# Patient Record
Sex: MALE | Race: WHITE | Employment: FULL TIME | URBAN - NONMETROPOLITAN AREA
[De-identification: names, ages, dates, MRNs, and addresses within clinical notes are randomized per-mention and may not be internally consistent; named-entity substitution may affect disease eponyms.]

---

## 2019-10-28 ENCOUNTER — APPOINTMENT (OUTPATIENT)
Dept: CT IMAGING | Age: 34
End: 2019-10-28
Payer: MEDICARE

## 2019-10-28 ENCOUNTER — HOSPITAL ENCOUNTER (EMERGENCY)
Age: 34
Discharge: HOME | End: 2019-10-28
Attending: EMERGENCY MEDICINE
Payer: MEDICARE

## 2019-10-28 VITALS
DIASTOLIC BLOOD PRESSURE: 59 MMHG | OXYGEN SATURATION: 99 % | HEART RATE: 49 BPM | RESPIRATION RATE: 18 BRPM | TEMPERATURE: 97.5 F | WEIGHT: 165 LBS | SYSTOLIC BLOOD PRESSURE: 100 MMHG

## 2019-10-28 DIAGNOSIS — R10.9 RIGHT FLANK PAIN: Primary | ICD-10-CM

## 2019-10-28 LAB
-: ABNORMAL
ABSOLUTE EOS #: 0.85 K/UL (ref 0–0.44)
ABSOLUTE IMMATURE GRANULOCYTE: <0.03 K/UL (ref 0–0.3)
ABSOLUTE LYMPH #: 2.66 K/UL (ref 1.1–3.7)
ABSOLUTE MONO #: 0.72 K/UL (ref 0.1–1.2)
ALBUMIN SERPL-MCNC: 4.7 G/DL (ref 3.5–5.2)
ALBUMIN/GLOBULIN RATIO: 1.6 (ref 1–2.5)
ALP BLD-CCNC: 70 U/L (ref 40–129)
ALT SERPL-CCNC: 41 U/L (ref 5–41)
AMORPHOUS: ABNORMAL
ANION GAP SERPL CALCULATED.3IONS-SCNC: 10 MMOL/L (ref 9–17)
AST SERPL-CCNC: 26 U/L
BACTERIA: ABNORMAL
BASOPHILS # BLD: 1 % (ref 0–2)
BASOPHILS ABSOLUTE: 0.07 K/UL (ref 0–0.2)
BILIRUB SERPL-MCNC: 0.29 MG/DL (ref 0.3–1.2)
BILIRUBIN URINE: NEGATIVE
BUN BLDV-MCNC: 11 MG/DL (ref 6–20)
BUN/CREAT BLD: 15 (ref 9–20)
CALCIUM SERPL-MCNC: 9.8 MG/DL (ref 8.6–10.4)
CASTS UA: ABNORMAL /LPF
CHLORIDE BLD-SCNC: 111 MMOL/L (ref 98–107)
CO2: 23 MMOL/L (ref 20–31)
COLOR: YELLOW
COMMENT UA: ABNORMAL
CREAT SERPL-MCNC: 0.74 MG/DL (ref 0.7–1.2)
CRYSTALS, UA: ABNORMAL /HPF
DIFFERENTIAL TYPE: ABNORMAL
EOSINOPHILS RELATIVE PERCENT: 10 % (ref 1–4)
EPITHELIAL CELLS UA: ABNORMAL /HPF (ref 0–5)
GFR AFRICAN AMERICAN: >60 ML/MIN
GFR NON-AFRICAN AMERICAN: >60 ML/MIN
GFR SERPL CREATININE-BSD FRML MDRD: ABNORMAL ML/MIN/{1.73_M2}
GFR SERPL CREATININE-BSD FRML MDRD: ABNORMAL ML/MIN/{1.73_M2}
GLUCOSE BLD-MCNC: 99 MG/DL (ref 70–99)
GLUCOSE URINE: NEGATIVE
HCT VFR BLD CALC: 48 % (ref 40.7–50.3)
HEMOGLOBIN: 15.9 G/DL (ref 13–17)
IMMATURE GRANULOCYTES: 0 %
KETONES, URINE: NEGATIVE
LEUKOCYTE ESTERASE, URINE: NEGATIVE
LIPASE: 74 U/L (ref 13–60)
LYMPHOCYTES # BLD: 32 % (ref 24–43)
MCH RBC QN AUTO: 31.8 PG (ref 25.2–33.5)
MCHC RBC AUTO-ENTMCNC: 33.1 G/DL (ref 28.4–34.8)
MCV RBC AUTO: 96 FL (ref 82.6–102.9)
MONOCYTES # BLD: 9 % (ref 3–12)
MUCUS: ABNORMAL
NITRITE, URINE: NEGATIVE
NRBC AUTOMATED: 0 PER 100 WBC
OTHER OBSERVATIONS UA: ABNORMAL
PDW BLD-RTO: 13.2 % (ref 11.8–14.4)
PH UA: 6 (ref 5–9)
PLATELET # BLD: 262 K/UL (ref 138–453)
PLATELET ESTIMATE: ABNORMAL
PMV BLD AUTO: 9.2 FL (ref 8.1–13.5)
POTASSIUM SERPL-SCNC: 4.5 MMOL/L (ref 3.7–5.3)
PROTEIN UA: NEGATIVE
RBC # BLD: 5 M/UL (ref 4.21–5.77)
RBC # BLD: ABNORMAL 10*6/UL
RBC UA: ABNORMAL /HPF (ref 0–2)
RENAL EPITHELIAL, UA: ABNORMAL /HPF
SEG NEUTROPHILS: 48 % (ref 36–65)
SEGMENTED NEUTROPHILS ABSOLUTE COUNT: 4.12 K/UL (ref 1.5–8.1)
SODIUM BLD-SCNC: 144 MMOL/L (ref 135–144)
SPECIFIC GRAVITY UA: >1.03 (ref 1.01–1.02)
TOTAL PROTEIN: 7.7 G/DL (ref 6.4–8.3)
TRICHOMONAS: ABNORMAL
TURBIDITY: CLEAR
URINE HGB: NEGATIVE
UROBILINOGEN, URINE: NORMAL
WBC # BLD: 8.4 K/UL (ref 3.5–11.3)
WBC # BLD: ABNORMAL 10*3/UL
WBC UA: ABNORMAL /HPF (ref 0–5)
YEAST: ABNORMAL

## 2019-10-28 PROCEDURE — 74176 CT ABD & PELVIS W/O CONTRAST: CPT

## 2019-10-28 PROCEDURE — 2580000003 HC RX 258: Performed by: EMERGENCY MEDICINE

## 2019-10-28 PROCEDURE — 6360000002 HC RX W HCPCS: Performed by: EMERGENCY MEDICINE

## 2019-10-28 PROCEDURE — 83690 ASSAY OF LIPASE: CPT

## 2019-10-28 PROCEDURE — 96374 THER/PROPH/DIAG INJ IV PUSH: CPT

## 2019-10-28 PROCEDURE — 80053 COMPREHEN METABOLIC PANEL: CPT

## 2019-10-28 PROCEDURE — 36415 COLL VENOUS BLD VENIPUNCTURE: CPT

## 2019-10-28 PROCEDURE — 85025 COMPLETE CBC W/AUTO DIFF WBC: CPT

## 2019-10-28 PROCEDURE — 81001 URINALYSIS AUTO W/SCOPE: CPT

## 2019-10-28 PROCEDURE — 99284 EMERGENCY DEPT VISIT MOD MDM: CPT

## 2019-10-28 PROCEDURE — 96375 TX/PRO/DX INJ NEW DRUG ADDON: CPT

## 2019-10-28 RX ORDER — ROPINIROLE 0.5 MG/1
0.5 TABLET, FILM COATED ORAL NIGHTLY
Status: ON HOLD | COMMUNITY
End: 2022-02-12

## 2019-10-28 RX ORDER — MORPHINE SULFATE 4 MG/ML
4 INJECTION, SOLUTION INTRAMUSCULAR; INTRAVENOUS ONCE
Status: COMPLETED | OUTPATIENT
Start: 2019-10-28 | End: 2019-10-28

## 2019-10-28 RX ORDER — 0.9 % SODIUM CHLORIDE 0.9 %
1000 INTRAVENOUS SOLUTION INTRAVENOUS ONCE
Status: COMPLETED | OUTPATIENT
Start: 2019-10-28 | End: 2019-10-28

## 2019-10-28 RX ORDER — ONDANSETRON 2 MG/ML
4 INJECTION INTRAMUSCULAR; INTRAVENOUS ONCE
Status: COMPLETED | OUTPATIENT
Start: 2019-10-28 | End: 2019-10-28

## 2019-10-28 RX ADMIN — ONDANSETRON 4 MG: 2 INJECTION INTRAMUSCULAR; INTRAVENOUS at 09:24

## 2019-10-28 RX ADMIN — MORPHINE SULFATE 4 MG: 4 INJECTION, SOLUTION INTRAMUSCULAR; INTRAVENOUS at 09:24

## 2019-10-28 RX ADMIN — SODIUM CHLORIDE 1000 ML: 9 INJECTION, SOLUTION INTRAVENOUS at 09:24

## 2019-10-28 ASSESSMENT — ENCOUNTER SYMPTOMS
EYE PAIN: 0
SHORTNESS OF BREATH: 0
ABDOMINAL PAIN: 0

## 2019-10-28 ASSESSMENT — PAIN SCALES - GENERAL
PAINLEVEL_OUTOF10: 4
PAINLEVEL_OUTOF10: 7
PAINLEVEL_OUTOF10: 7

## 2019-10-28 ASSESSMENT — PAIN DESCRIPTION - PAIN TYPE: TYPE: ACUTE PAIN

## 2019-10-28 ASSESSMENT — PAIN DESCRIPTION - LOCATION
LOCATION: ABDOMEN
LOCATION: ABDOMEN

## 2019-10-28 ASSESSMENT — PAIN DESCRIPTION - ORIENTATION: ORIENTATION: RIGHT;LOWER

## 2019-10-28 ASSESSMENT — PAIN DESCRIPTION - DESCRIPTORS: DESCRIPTORS: SQUEEZING;BURNING

## 2019-10-29 ENCOUNTER — TELEPHONE (OUTPATIENT)
Dept: UROLOGY | Age: 34
End: 2019-10-29

## 2020-07-23 ENCOUNTER — HOSPITAL ENCOUNTER (EMERGENCY)
Age: 35
Discharge: ANOTHER HOSPITAL | End: 2020-07-24
Attending: EMERGENCY MEDICINE
Payer: MEDICARE

## 2020-07-23 PROCEDURE — 99284 EMERGENCY DEPT VISIT MOD MDM: CPT

## 2020-07-23 RX ORDER — GUAIFENESIN, PSEUDOEPHEDRINE HYDROCHLORIDE 600; 60 MG/1; MG/1
1 TABLET, EXTENDED RELEASE ORAL EVERY 12 HOURS
COMMUNITY

## 2020-07-23 RX ORDER — ACETAMINOPHEN 500 MG
1000 TABLET ORAL ONCE
Status: COMPLETED | OUTPATIENT
Start: 2020-07-24 | End: 2020-07-24

## 2020-07-23 RX ORDER — KETOROLAC TROMETHAMINE 15 MG/ML
15 INJECTION, SOLUTION INTRAMUSCULAR; INTRAVENOUS ONCE
Status: COMPLETED | OUTPATIENT
Start: 2020-07-24 | End: 2020-07-24

## 2020-07-23 RX ORDER — 0.9 % SODIUM CHLORIDE 0.9 %
1000 INTRAVENOUS SOLUTION INTRAVENOUS ONCE
Status: COMPLETED | OUTPATIENT
Start: 2020-07-24 | End: 2020-07-24

## 2020-07-23 RX ORDER — LIDOCAINE HYDROCHLORIDE 20 MG/ML
15 SOLUTION OROPHARYNGEAL PRN
COMMUNITY

## 2020-07-23 ASSESSMENT — PAIN DESCRIPTION - FREQUENCY: FREQUENCY: CONTINUOUS

## 2020-07-23 ASSESSMENT — PAIN DESCRIPTION - DESCRIPTORS: DESCRIPTORS: ACHING

## 2020-07-23 ASSESSMENT — PAIN DESCRIPTION - PAIN TYPE: TYPE: ACUTE PAIN

## 2020-07-23 ASSESSMENT — PAIN SCALES - GENERAL: PAINLEVEL_OUTOF10: 7

## 2020-07-24 ENCOUNTER — APPOINTMENT (OUTPATIENT)
Dept: CT IMAGING | Age: 35
End: 2020-07-24
Payer: MEDICARE

## 2020-07-24 VITALS
BODY MASS INDEX: 22.22 KG/M2 | OXYGEN SATURATION: 97 % | TEMPERATURE: 98.7 F | HEIGHT: 69 IN | HEART RATE: 68 BPM | RESPIRATION RATE: 16 BRPM | WEIGHT: 150 LBS | SYSTOLIC BLOOD PRESSURE: 115 MMHG | DIASTOLIC BLOOD PRESSURE: 72 MMHG

## 2020-07-24 LAB
-: NORMAL
ABSOLUTE EOS #: 0.54 K/UL (ref 0–0.44)
ABSOLUTE IMMATURE GRANULOCYTE: 0.04 K/UL (ref 0–0.3)
ABSOLUTE LYMPH #: 2.57 K/UL (ref 1.1–3.7)
ABSOLUTE MONO #: 1.4 K/UL (ref 0.1–1.2)
ALBUMIN SERPL-MCNC: 4.1 G/DL (ref 3.5–5.2)
ALBUMIN/GLOBULIN RATIO: 1.5 (ref 1–2.5)
ALP BLD-CCNC: 87 U/L (ref 40–129)
ALT SERPL-CCNC: 39 U/L (ref 5–41)
AMORPHOUS: NORMAL
ANION GAP SERPL CALCULATED.3IONS-SCNC: 9 MMOL/L (ref 9–17)
AST SERPL-CCNC: 30 U/L
BACTERIA: NORMAL
BASOPHILS # BLD: 1 % (ref 0–2)
BASOPHILS ABSOLUTE: 0.07 K/UL (ref 0–0.2)
BILIRUB SERPL-MCNC: 0.22 MG/DL (ref 0.3–1.2)
BILIRUBIN URINE: NEGATIVE
BUN BLDV-MCNC: 15 MG/DL (ref 6–20)
BUN/CREAT BLD: 18 (ref 9–20)
CALCIUM SERPL-MCNC: 9.1 MG/DL (ref 8.6–10.4)
CASTS UA: NORMAL /LPF
CHLORIDE BLD-SCNC: 105 MMOL/L (ref 98–107)
CO2: 25 MMOL/L (ref 20–31)
COLOR: YELLOW
COMMENT UA: NORMAL
CREAT SERPL-MCNC: 0.84 MG/DL (ref 0.7–1.2)
CRYSTALS, UA: NORMAL /HPF
DIFFERENTIAL TYPE: ABNORMAL
EOSINOPHILS RELATIVE PERCENT: 4 % (ref 1–4)
EPITHELIAL CELLS UA: NORMAL /HPF (ref 0–5)
GFR AFRICAN AMERICAN: >60 ML/MIN
GFR NON-AFRICAN AMERICAN: >60 ML/MIN
GFR SERPL CREATININE-BSD FRML MDRD: ABNORMAL ML/MIN/{1.73_M2}
GFR SERPL CREATININE-BSD FRML MDRD: ABNORMAL ML/MIN/{1.73_M2}
GLUCOSE BLD-MCNC: 93 MG/DL (ref 70–99)
GLUCOSE URINE: NEGATIVE
HCT VFR BLD CALC: 46.5 % (ref 40.7–50.3)
HEMOGLOBIN: 15.2 G/DL (ref 13–17)
IMMATURE GRANULOCYTES: 0 %
KETONES, URINE: NEGATIVE
LACTIC ACID, SEPSIS WHOLE BLOOD: NORMAL MMOL/L (ref 0.5–1.9)
LACTIC ACID, SEPSIS: 0.6 MMOL/L (ref 0.5–1.9)
LEUKOCYTE ESTERASE, URINE: NEGATIVE
LYMPHOCYTES # BLD: 18 % (ref 24–43)
MCH RBC QN AUTO: 31.7 PG (ref 25.2–33.5)
MCHC RBC AUTO-ENTMCNC: 32.7 G/DL (ref 28.4–34.8)
MCV RBC AUTO: 96.9 FL (ref 82.6–102.9)
MONOCYTES # BLD: 10 % (ref 3–12)
MUCUS: NORMAL
NITRITE, URINE: NEGATIVE
NRBC AUTOMATED: 0 PER 100 WBC
OTHER OBSERVATIONS UA: NORMAL
PDW BLD-RTO: 12.8 % (ref 11.8–14.4)
PH UA: 6.5 (ref 5–9)
PLATELET # BLD: 236 K/UL (ref 138–453)
PLATELET ESTIMATE: ABNORMAL
PMV BLD AUTO: 9.2 FL (ref 8.1–13.5)
POTASSIUM SERPL-SCNC: 4 MMOL/L (ref 3.7–5.3)
PROTEIN UA: NEGATIVE
RBC # BLD: 4.8 M/UL (ref 4.21–5.77)
RBC # BLD: ABNORMAL 10*6/UL
RBC UA: NORMAL /HPF (ref 0–2)
RENAL EPITHELIAL, UA: NORMAL /HPF
SEG NEUTROPHILS: 67 % (ref 36–65)
SEGMENTED NEUTROPHILS ABSOLUTE COUNT: 9.51 K/UL (ref 1.5–8.1)
SODIUM BLD-SCNC: 139 MMOL/L (ref 135–144)
SPECIFIC GRAVITY UA: 1.02 (ref 1.01–1.02)
TOTAL PROTEIN: 6.9 G/DL (ref 6.4–8.3)
TRICHOMONAS: NORMAL
TURBIDITY: CLEAR
URINE HGB: NEGATIVE
UROBILINOGEN, URINE: NORMAL
WBC # BLD: 14.1 K/UL (ref 3.5–11.3)
WBC # BLD: ABNORMAL 10*3/UL
WBC UA: NORMAL /HPF (ref 0–5)
YEAST: NORMAL

## 2020-07-24 PROCEDURE — 85025 COMPLETE CBC W/AUTO DIFF WBC: CPT

## 2020-07-24 PROCEDURE — 6370000000 HC RX 637 (ALT 250 FOR IP): Performed by: EMERGENCY MEDICINE

## 2020-07-24 PROCEDURE — 6360000002 HC RX W HCPCS: Performed by: EMERGENCY MEDICINE

## 2020-07-24 PROCEDURE — 87205 SMEAR GRAM STAIN: CPT

## 2020-07-24 PROCEDURE — 81001 URINALYSIS AUTO W/SCOPE: CPT

## 2020-07-24 PROCEDURE — 96365 THER/PROPH/DIAG IV INF INIT: CPT

## 2020-07-24 PROCEDURE — 36415 COLL VENOUS BLD VENIPUNCTURE: CPT

## 2020-07-24 PROCEDURE — 87040 BLOOD CULTURE FOR BACTERIA: CPT

## 2020-07-24 PROCEDURE — 6360000004 HC RX CONTRAST MEDICATION: Performed by: EMERGENCY MEDICINE

## 2020-07-24 PROCEDURE — 6360000002 HC RX W HCPCS

## 2020-07-24 PROCEDURE — 2580000003 HC RX 258: Performed by: EMERGENCY MEDICINE

## 2020-07-24 PROCEDURE — 96375 TX/PRO/DX INJ NEW DRUG ADDON: CPT

## 2020-07-24 PROCEDURE — 83605 ASSAY OF LACTIC ACID: CPT

## 2020-07-24 PROCEDURE — 80053 COMPREHEN METABOLIC PANEL: CPT

## 2020-07-24 PROCEDURE — 70491 CT SOFT TISSUE NECK W/DYE: CPT

## 2020-07-24 RX ORDER — ONDANSETRON 2 MG/ML
INJECTION INTRAMUSCULAR; INTRAVENOUS
Status: COMPLETED
Start: 2020-07-24 | End: 2020-07-24

## 2020-07-24 RX ORDER — SODIUM CHLORIDE 0.9 % (FLUSH) 0.9 %
10 SYRINGE (ML) INJECTION PRN
Status: DISCONTINUED | OUTPATIENT
Start: 2020-07-24 | End: 2020-07-24 | Stop reason: HOSPADM

## 2020-07-24 RX ORDER — DEXAMETHASONE SODIUM PHOSPHATE 10 MG/ML
10 INJECTION INTRAMUSCULAR; INTRAVENOUS ONCE
Status: COMPLETED | OUTPATIENT
Start: 2020-07-24 | End: 2020-07-24

## 2020-07-24 RX ORDER — SODIUM CHLORIDE 0.9 % (FLUSH) 0.9 %
10 SYRINGE (ML) INJECTION EVERY 12 HOURS SCHEDULED
Status: DISCONTINUED | OUTPATIENT
Start: 2020-07-24 | End: 2020-07-24 | Stop reason: HOSPADM

## 2020-07-24 RX ORDER — MORPHINE SULFATE 4 MG/ML
4 INJECTION, SOLUTION INTRAMUSCULAR; INTRAVENOUS ONCE
Status: COMPLETED | OUTPATIENT
Start: 2020-07-24 | End: 2020-07-24

## 2020-07-24 RX ADMIN — DEXAMETHASONE SODIUM PHOSPHATE 10 MG: 10 INJECTION INTRAMUSCULAR; INTRAVENOUS at 01:52

## 2020-07-24 RX ADMIN — ONDANSETRON 4 MG: 2 INJECTION INTRAMUSCULAR; INTRAVENOUS at 02:51

## 2020-07-24 RX ADMIN — IOPAMIDOL 75 ML: 755 INJECTION, SOLUTION INTRAVENOUS at 00:32

## 2020-07-24 RX ADMIN — KETOROLAC TROMETHAMINE 15 MG: 15 INJECTION, SOLUTION INTRAMUSCULAR; INTRAVENOUS at 00:11

## 2020-07-24 RX ADMIN — MORPHINE SULFATE 4 MG: 4 INJECTION, SOLUTION INTRAMUSCULAR; INTRAVENOUS at 01:52

## 2020-07-24 RX ADMIN — ACETAMINOPHEN 1000 MG: 500 TABLET, FILM COATED ORAL at 00:11

## 2020-07-24 RX ADMIN — AMPICILLIN AND SULBACTAM 1.5 G: .5; 1 INJECTION, POWDER, FOR SOLUTION INTRAMUSCULAR; INTRAVENOUS at 01:51

## 2020-07-24 RX ADMIN — SODIUM CHLORIDE 1000 ML: 9 INJECTION, SOLUTION INTRAVENOUS at 00:11

## 2020-07-24 ASSESSMENT — ENCOUNTER SYMPTOMS
SORE THROAT: 1
SHORTNESS OF BREATH: 0
ABDOMINAL PAIN: 0
EYE PAIN: 0

## 2020-07-24 ASSESSMENT — PAIN SCALES - GENERAL: PAINLEVEL_OUTOF10: 7

## 2020-07-24 NOTE — ED NOTES
Patient states generalized body aches. \"Feels like his body is on fire. \"     Yair Bonilla RN  07/23/20 0887

## 2020-07-24 NOTE — ED NOTES
Called [facility] for transfer to [facility] (pt request), paging ENT on call.  Waiting for call back     Michele Krzysztof  07/24/20 0114

## 2020-07-24 NOTE — ED PROVIDER NOTES
[facility] ED  eMERGENCY dEPARTMENT eNCOUnter      Pt Name: Uriel Khoury  MRN: [id number]  [place] 1985  Date of evaluation: 7/23/2020  Provider: Lisa Gramajo MD    CHIEF COMPLAINT     Chief Complaint   Patient presents with    Fever     states 104.4 PTA    URI     diagnosed as URI at 0600; states called 24 hour nurse line; nurse told patient to return to ED immediately; states right ear was draining water PTA       HISTORY OF PRESENT ILLNESS    Uriel Khoury is a 30 y.o. male who presents to the emergency department for evaluation of fever, sore throat, right ear pain. Patient states symptoms started this morning approximately 6 AM.  He states he has had a fever on and off all day today which she has been taking Motrin for. Patient states pain is worse to his right neck and right side of his throat. He also has pain radiating up to the right ear. Pain is worse with swallowing. He denies any difficulty breathing or shortness of breath. Patient's pain is worse with neck movement. PAST MEDICAL HISTORY     Past Medical History:   Diagnosis Date    RLS (restless legs syndrome)        SURGICAL HISTORY       Past Surgical History:   Procedure Laterality Date    APPENDECTOMY      CHOLECYSTECTOMY         CURRENT MEDICATIONS       Previous Medications    LIDOCAINE VISCOUS HCL (XYLOCAINE) 2 % SOLN SOLUTION    Take 15 mLs by mouth as needed for Irritation    NAPROXEN (NAPROSYN) 375 MG TABLET    Take 1 tablet by mouth 2 times daily    PSEUDOEPHEDRINE-GUAIFENESIN (MUCINEX D)  MG PER EXTENDED RELEASE TABLET    Take 1 tablet by mouth every 12 hours    ROPINIROLE (REQUIP) 0.5 MG TABLET    Take 0.5 mg by mouth nightly       ALLERGIES     Patient has no known allergies. FAMILY HISTORY     History reviewed. No pertinent family history.      SOCIAL HISTORY       Social History     Socioeconomic History    Marital status: Single     Spouse name: None    Number of children: None    Years of education: None    Highest education level: None   Occupational History    None   Social Needs    Financial resource strain: None    Food insecurity     Worry: None     Inability: None    Transportation needs     Medical: None     Non-medical: None   Tobacco Use    Smoking status: Current Every Day Smoker     Packs/day: 1.00     Types: Cigarettes    Smokeless tobacco: Never Used   Substance and Sexual Activity    Alcohol use: No    Drug use: Yes     Types: Marijuana    Sexual activity: None   Lifestyle    Physical activity     Days per week: None     Minutes per session: None    Stress: None   Relationships    Social connections     Talks on phone: None     Gets together: None     Attends Pentecostalism service: None     Active member of club or organization: None     Attends meetings of clubs or organizations: None     Relationship status: None    Intimate partner violence     Fear of current or ex partner: None     Emotionally abused: None     Physically abused: None     Forced sexual activity: None   Other Topics Concern    None   Social History Narrative    None       REVIEW OF SYSTEMS       Review of Systems   Constitutional: Positive for fever. HENT: Positive for ear pain and sore throat. Eyes: Negative for pain. Respiratory: Negative for shortness of breath. Cardiovascular: Negative for chest pain. Gastrointestinal: Negative for abdominal pain. Genitourinary: Negative for dysuria. Musculoskeletal: Positive for neck pain. Skin: Negative for rash. Allergic/Immunologic: Negative for immunocompromised state. Neurological: Negative for headaches.        PHYSICAL EXAM    (up to 7 for level 4, 8 or more for level 5)     ED Triage Vitals   BP Temp Temp Source Pulse Resp SpO2 Height Weight   07/23/20 2323 07/23/20 2325 07/23/20 2325 07/23/20 2325 07/23/20 2325 07/23/20 2323 07/23/20 2325 07/23/20 2325   129/80 98.7 °F (37.1 °C) Oral 92 20 98 % 5' 9\" (1.753 m) 150 lb (68 kg) Physical Exam  Vitals signs and nursing note reviewed. Constitutional:       General: He is not in acute distress. Appearance: He is well-developed. HENT:      Head: Normocephalic and atraumatic. Mouth/Throat:      Mouth: Mucous membranes are moist. No lacerations or angioedema. Pharynx: Uvula midline. Pharyngeal swelling, oropharyngeal exudate and posterior oropharyngeal erythema present. No uvula swelling. Tonsils: 2+ on the right. 1+ on the left. Comments: Erythematous posterior oropharynx with 2+ swelling to the right tonsils. There is asymmetry with right greater than left. Uvula is midline. Airway is patent. Eyes:      General:         Right eye: No discharge. Left eye: No discharge. Conjunctiva/sclera: Conjunctivae normal.   Neck:      Musculoskeletal: Neck supple. Pain with movement present. No neck rigidity or spinous process tenderness. Trachea: Trachea and phonation normal.   Cardiovascular:      Rate and Rhythm: Normal rate and regular rhythm. Pulmonary:      Effort: Pulmonary effort is normal. No respiratory distress. Breath sounds: No stridor. Abdominal:      General: There is no distension. Palpations: Abdomen is soft. Musculoskeletal:         General: No tenderness. Lymphadenopathy:      Cervical: Cervical adenopathy present. Skin:     General: Skin is warm and dry. Findings: No erythema. Neurological:      Mental Status: He is alert and oriented to person, place, and time. DIAGNOSTIC RESULTS     RADIOLOGY: (none if blank)   Interpretation per theRadiologist below, if available at the time of this note:    CT SOFT TISSUE NECK W CONTRAST   Final Result   Right peritonsillar abscess.              LABS:  Labs Reviewed   CBC WITH AUTO DIFFERENTIAL - Abnormal; Notable for the following components:       Result Value    WBC 14.1 (*)     Seg Neutrophils 67 (*)     Lymphocytes 18 (*)     Segs Absolute 9.51 (*) Absolute Mono # 1.40 (*)     Absolute Eos # 0.54 (*)     All other components within normal limits   COMPREHENSIVE METABOLIC PANEL W/ REFLEX TO MG FOR LOW K - Abnormal; Notable for the following components: Total Bilirubin 0.22 (*)     All other components within normal limits   CULTURE, BLOOD 1   CULTURE, BLOOD 2   URINE RT REFLEX TO CULTURE   MICROSCOPIC URINALYSIS   LACTATE, SEPSIS   LACTATE, SEPSIS       All other labs were within normal range or not returned as of this dictation. EMERGENCY DEPARTMENT COURSE and Medical Decision Making: On evaluation, patient is in no distress. Patient does have tenderness to right side of neck with cervical adenopathy. There is increased swelling to right tonsil compared to left with significant erythema. Airway is patent. Uvula is midline. No tongue swelling. Due to swelling to posterior oropharynx, CT neck with contrast was ordered to further assess abscess. Toradol was given for symptomatic relief. Patient is afebrile here in the emergency department. Patient did take Motrin prior to arrival.    Patient does have leukocytosis of 14.1. CMP is unremarkable. CT was read by the radiologist and shows a 1 cm x 1 cm right peritonsillar abscess. Patient was started on Unasyn and 10 mg Decadron due to abscess. There is no ENT available at [facility]. Transfer was discussed with the patient. Patient is requesting to go to [facility] in [place] as he states this is much closer to home and to his family. He does not want to go to [place] or [address] [place] [address] if at all possible. [facility] does have ENT available. Dr. Ann Marie Fagan, ENT at [facility] was contacted but patient was not able to be discussed with him. Patient was then discussed with Elza Jain NP with the hospitalist service and Dr. Lelia Everett who did agree to accept the patient for transfer for if ENT will agree to be on consult.   [facility] then called back accepting the patient. Acceptance was discussed with the patient. He states he did feel improved. Throat was reassessed. Ramez Martinez continues to be midline. No change to swelling on the right side. Airway continues to be patent. Vitals continue to be stable. Patient was transferred to higher level of care due to need for ENT. Patient did request [facility].    ED Medications administered this visit:    Medications   sodium chloride flush 0.9 % injection 10 mL (has no administration in time range)   sodium chloride flush 0.9 % injection 10 mL (has no administration in time range)   acetaminophen (TYLENOL) tablet 1,000 mg (1,000 mg Oral Given 7/24/20 0011)   ketorolac (TORADOL) injection 15 mg (15 mg Intravenous Given 7/24/20 0011)   0.9 % sodium chloride bolus (1,000 mLs Intravenous New Bag 7/24/20 0011)   iopamidol (ISOVUE-370) 76 % injection 75 mL (75 mLs Intravenous Given 7/24/20 0032)   ampicillin-sulbactam (UNASYN) 1.5 g IVPB minibag (1.5 g Intravenous New Bag 7/24/20 0151)   dexamethasone (DECADRON) injection 10 mg (10 mg Oral Given 7/24/20 0152)   morphine injection 4 mg (4 mg Intravenous Given 7/24/20 0152)       CLINICAL IMPRESSION      1. Peritonsillar abscess          DISPOSITION/PLAN   DISPOSITION Decision To Transfer 07/24/2020 01:08:54 AM      PATIENT REFERRED TO:  No follow-up provider specified.     DISCHARGE MEDICATIONS:  New Prescriptions    No medications on file            Kiley Kapadia MD (electronically signed)  AttendingEmergency Department Provider            Kiley Kapadia MD  07/24/20 9026

## 2020-07-24 NOTE — ED NOTES
Pt accepted at [facility],  [address] [address]  07/24/20 5949 [place]

## 2020-07-24 NOTE — ED NOTES
[facility] new ETA 0400 (per Iraida Kumar at [facility])     Sylvia Left  07/24/20 2942

## 2020-07-29 LAB
CULTURE: ABNORMAL
CULTURE: NORMAL
Lab: ABNORMAL
Lab: NORMAL
SPECIMEN DESCRIPTION: ABNORMAL
SPECIMEN DESCRIPTION: NORMAL

## 2022-02-11 ENCOUNTER — APPOINTMENT (OUTPATIENT)
Dept: CT IMAGING | Age: 37
DRG: 383 | End: 2022-02-11
Payer: MEDICARE

## 2022-02-11 ENCOUNTER — HOSPITAL ENCOUNTER (EMERGENCY)
Age: 37
Discharge: HOME | DRG: 383 | End: 2022-02-11
Attending: EMERGENCY MEDICINE
Payer: MEDICARE

## 2022-02-11 VITALS
HEIGHT: 69 IN | RESPIRATION RATE: 18 BRPM | HEART RATE: 101 BPM | OXYGEN SATURATION: 96 % | BODY MASS INDEX: 23.7 KG/M2 | TEMPERATURE: 97.7 F | WEIGHT: 160 LBS | SYSTOLIC BLOOD PRESSURE: 134 MMHG | DIASTOLIC BLOOD PRESSURE: 86 MMHG

## 2022-02-11 DIAGNOSIS — L03.311 CELLULITIS OF ABDOMINAL WALL: Primary | ICD-10-CM

## 2022-02-11 LAB
ALBUMIN SERPL-MCNC: 4.6 G/DL (ref 3.5–5.2)
ALBUMIN/GLOBULIN RATIO: 1.5 (ref 1–2.5)
ALP BLD-CCNC: 92 U/L (ref 40–129)
ALT SERPL-CCNC: 16 U/L (ref 5–41)
ANION GAP SERPL CALCULATED.3IONS-SCNC: 10 MMOL/L (ref 9–17)
AST SERPL-CCNC: 17 U/L
BILIRUB SERPL-MCNC: 0.39 MG/DL (ref 0.3–1.2)
BILIRUBIN DIRECT: <0.08 MG/DL
BILIRUBIN, INDIRECT: NORMAL MG/DL (ref 0–1)
BUN BLDV-MCNC: 9 MG/DL (ref 6–20)
BUN/CREAT BLD: 10 (ref 9–20)
CALCIUM SERPL-MCNC: 9.5 MG/DL (ref 8.6–10.4)
CHLORIDE BLD-SCNC: 108 MMOL/L (ref 98–107)
CO2: 23 MMOL/L (ref 20–31)
CREAT SERPL-MCNC: 0.91 MG/DL (ref 0.7–1.2)
GFR AFRICAN AMERICAN: >60 ML/MIN
GFR NON-AFRICAN AMERICAN: >60 ML/MIN
GFR SERPL CREATININE-BSD FRML MDRD: ABNORMAL ML/MIN/{1.73_M2}
GFR SERPL CREATININE-BSD FRML MDRD: ABNORMAL ML/MIN/{1.73_M2}
GLOBULIN: NORMAL G/DL (ref 1.5–3.8)
GLUCOSE BLD-MCNC: 79 MG/DL (ref 70–99)
LIPASE: 43 U/L (ref 13–60)
POTASSIUM SERPL-SCNC: 4.1 MMOL/L (ref 3.7–5.3)
SODIUM BLD-SCNC: 141 MMOL/L (ref 135–144)
TOTAL PROTEIN: 7.6 G/DL (ref 6.4–8.3)

## 2022-02-11 PROCEDURE — 80048 BASIC METABOLIC PNL TOTAL CA: CPT

## 2022-02-11 PROCEDURE — 6360000004 HC RX CONTRAST MEDICATION: Performed by: EMERGENCY MEDICINE

## 2022-02-11 PROCEDURE — 74177 CT ABD & PELVIS W/CONTRAST: CPT

## 2022-02-11 PROCEDURE — 80076 HEPATIC FUNCTION PANEL: CPT

## 2022-02-11 PROCEDURE — 6370000000 HC RX 637 (ALT 250 FOR IP): Performed by: EMERGENCY MEDICINE

## 2022-02-11 PROCEDURE — 2580000003 HC RX 258: Performed by: EMERGENCY MEDICINE

## 2022-02-11 PROCEDURE — 2500000003 HC RX 250 WO HCPCS: Performed by: EMERGENCY MEDICINE

## 2022-02-11 PROCEDURE — 6360000002 HC RX W HCPCS: Performed by: EMERGENCY MEDICINE

## 2022-02-11 PROCEDURE — 96375 TX/PRO/DX INJ NEW DRUG ADDON: CPT

## 2022-02-11 PROCEDURE — 83690 ASSAY OF LIPASE: CPT

## 2022-02-11 PROCEDURE — 96374 THER/PROPH/DIAG INJ IV PUSH: CPT

## 2022-02-11 PROCEDURE — 99284 EMERGENCY DEPT VISIT MOD MDM: CPT

## 2022-02-11 RX ORDER — KETOROLAC TROMETHAMINE 15 MG/ML
30 INJECTION, SOLUTION INTRAMUSCULAR; INTRAVENOUS ONCE
Status: COMPLETED | OUTPATIENT
Start: 2022-02-11 | End: 2022-02-11

## 2022-02-11 RX ORDER — CEPHALEXIN 500 MG/1
500 CAPSULE ORAL ONCE
Status: COMPLETED | OUTPATIENT
Start: 2022-02-11 | End: 2022-02-11

## 2022-02-11 RX ORDER — NAPROXEN SODIUM 220 MG
220 TABLET ORAL DAILY
COMMUNITY

## 2022-02-11 RX ORDER — CEPHALEXIN 500 MG/1
500 CAPSULE ORAL 4 TIMES DAILY
Qty: 40 CAPSULE | Refills: 0 | Status: SHIPPED | OUTPATIENT
Start: 2022-02-11

## 2022-02-11 RX ORDER — 0.9 % SODIUM CHLORIDE 0.9 %
1000 INTRAVENOUS SOLUTION INTRAVENOUS ONCE
Status: COMPLETED | OUTPATIENT
Start: 2022-02-11 | End: 2022-02-11

## 2022-02-11 RX ADMIN — KETOROLAC TROMETHAMINE 30 MG: 15 INJECTION, SOLUTION INTRAMUSCULAR; INTRAVENOUS at 22:46

## 2022-02-11 RX ADMIN — IOPAMIDOL 75 ML: 755 INJECTION, SOLUTION INTRAVENOUS at 22:47

## 2022-02-11 RX ADMIN — FAMOTIDINE 20 MG: 10 INJECTION, SOLUTION INTRAVENOUS at 22:46

## 2022-02-11 RX ADMIN — SODIUM CHLORIDE 1000 ML: 9 INJECTION, SOLUTION INTRAVENOUS at 22:47

## 2022-02-11 RX ADMIN — CEPHALEXIN 500 MG: 500 CAPSULE ORAL at 23:39

## 2022-02-11 ASSESSMENT — PAIN DESCRIPTION - DESCRIPTORS: DESCRIPTORS: DISCOMFORT

## 2022-02-11 ASSESSMENT — PAIN SCALES - GENERAL
PAINLEVEL_OUTOF10: 6
PAINLEVEL_OUTOF10: 7

## 2022-02-11 ASSESSMENT — PAIN DESCRIPTION - ORIENTATION: ORIENTATION: MID

## 2022-02-11 ASSESSMENT — PAIN DESCRIPTION - PAIN TYPE: TYPE: ACUTE PAIN

## 2022-02-11 ASSESSMENT — PAIN DESCRIPTION - LOCATION: LOCATION: ABDOMEN

## 2022-02-12 ENCOUNTER — APPOINTMENT (OUTPATIENT)
Dept: CT IMAGING | Age: 37
DRG: 383 | End: 2022-02-12
Payer: MEDICARE

## 2022-02-12 ENCOUNTER — HOSPITAL ENCOUNTER (INPATIENT)
Age: 37
LOS: 1 days | Discharge: LEFT BEFORE DISCHARGE | DRG: 383 | End: 2022-02-13
Attending: EMERGENCY MEDICINE | Admitting: STUDENT IN AN ORGANIZED HEALTH CARE EDUCATION/TRAINING PROGRAM
Payer: MEDICARE

## 2022-02-12 VITALS
BODY MASS INDEX: 25.11 KG/M2 | WEIGHT: 169.53 LBS | RESPIRATION RATE: 20 BRPM | DIASTOLIC BLOOD PRESSURE: 81 MMHG | HEIGHT: 69 IN | HEART RATE: 73 BPM | TEMPERATURE: 97.4 F | SYSTOLIC BLOOD PRESSURE: 130 MMHG | OXYGEN SATURATION: 98 %

## 2022-02-12 DIAGNOSIS — L03.311 CELLULITIS, ABDOMINAL WALL: Primary | ICD-10-CM

## 2022-02-12 PROBLEM — R10.33 UMBILICAL PAIN: Status: ACTIVE | Noted: 2022-02-12

## 2022-02-12 LAB
-: ABNORMAL
ABSOLUTE EOS #: 0.19 K/UL (ref 0–0.44)
ABSOLUTE IMMATURE GRANULOCYTE: 0.03 K/UL (ref 0–0.3)
ABSOLUTE LYMPH #: 2.11 K/UL (ref 1.1–3.7)
ABSOLUTE MONO #: 0.73 K/UL (ref 0.1–1.2)
ALBUMIN SERPL-MCNC: 4.5 G/DL (ref 3.5–5.2)
ALBUMIN/GLOBULIN RATIO: 1.7 (ref 1–2.5)
ALP BLD-CCNC: 87 U/L (ref 40–129)
ALT SERPL-CCNC: 16 U/L (ref 5–41)
AMORPHOUS: ABNORMAL
ANION GAP SERPL CALCULATED.3IONS-SCNC: 13 MMOL/L (ref 9–17)
AST SERPL-CCNC: 17 U/L
BACTERIA: ABNORMAL
BASOPHILS # BLD: 1 % (ref 0–2)
BASOPHILS ABSOLUTE: 0.07 K/UL (ref 0–0.2)
BILIRUB SERPL-MCNC: 0.5 MG/DL (ref 0.3–1.2)
BILIRUBIN URINE: ABNORMAL
BUN BLDV-MCNC: 9 MG/DL (ref 6–20)
BUN/CREAT BLD: 12 (ref 9–20)
C-REACTIVE PROTEIN: <3 MG/L (ref 0–5)
CALCIUM SERPL-MCNC: 9.7 MG/DL (ref 8.6–10.4)
CASTS UA: ABNORMAL /LPF
CHLORIDE BLD-SCNC: 107 MMOL/L (ref 98–107)
CO2: 20 MMOL/L (ref 20–31)
COLOR: YELLOW
COMMENT UA: ABNORMAL
CREAT SERPL-MCNC: 0.76 MG/DL (ref 0.7–1.2)
CRYSTALS, UA: ABNORMAL /HPF
DIFFERENTIAL TYPE: ABNORMAL
EOSINOPHILS RELATIVE PERCENT: 2 % (ref 1–4)
EPITHELIAL CELLS UA: ABNORMAL /HPF (ref 0–5)
GFR AFRICAN AMERICAN: >60 ML/MIN
GFR NON-AFRICAN AMERICAN: >60 ML/MIN
GFR SERPL CREATININE-BSD FRML MDRD: NORMAL ML/MIN/{1.73_M2}
GFR SERPL CREATININE-BSD FRML MDRD: NORMAL ML/MIN/{1.73_M2}
GLUCOSE BLD-MCNC: 81 MG/DL (ref 70–99)
GLUCOSE URINE: NEGATIVE
HCT VFR BLD CALC: 46.4 % (ref 40.7–50.3)
HEMOGLOBIN: 15.4 G/DL (ref 13–17)
IMMATURE GRANULOCYTES: 0 %
KETONES, URINE: ABNORMAL
LACTIC ACID, SEPSIS WHOLE BLOOD: NORMAL MMOL/L (ref 0.5–1.9)
LACTIC ACID, SEPSIS: 0.7 MMOL/L (ref 0.5–1.9)
LEUKOCYTE ESTERASE, URINE: NEGATIVE
LYMPHOCYTES # BLD: 22 % (ref 24–43)
MCH RBC QN AUTO: 31.5 PG (ref 25.2–33.5)
MCHC RBC AUTO-ENTMCNC: 33.2 G/DL (ref 28.4–34.8)
MCV RBC AUTO: 94.9 FL (ref 82.6–102.9)
MONOCYTES # BLD: 8 % (ref 3–12)
MUCUS: ABNORMAL
NITRITE, URINE: NEGATIVE
NRBC AUTOMATED: 0 PER 100 WBC
OTHER OBSERVATIONS UA: ABNORMAL
PDW BLD-RTO: 12.6 % (ref 11.8–14.4)
PH UA: 5.5 (ref 5–9)
PLATELET # BLD: 275 K/UL (ref 138–453)
PLATELET ESTIMATE: ABNORMAL
PMV BLD AUTO: 9.2 FL (ref 8.1–13.5)
POTASSIUM SERPL-SCNC: 4 MMOL/L (ref 3.7–5.3)
PROTEIN UA: NEGATIVE
RBC # BLD: 4.89 M/UL (ref 4.21–5.77)
RBC # BLD: ABNORMAL 10*6/UL
RBC UA: ABNORMAL /HPF (ref 0–2)
RENAL EPITHELIAL, UA: ABNORMAL /HPF
SARS-COV-2, RAPID: NOT DETECTED
SEG NEUTROPHILS: 67 % (ref 36–65)
SEGMENTED NEUTROPHILS ABSOLUTE COUNT: 6.35 K/UL (ref 1.5–8.1)
SODIUM BLD-SCNC: 140 MMOL/L (ref 135–144)
SPECIFIC GRAVITY UA: >1.03 (ref 1.01–1.02)
SPECIMEN DESCRIPTION: NORMAL
TOTAL PROTEIN: 7.2 G/DL (ref 6.4–8.3)
TRICHOMONAS: ABNORMAL
TURBIDITY: CLEAR
URINE HGB: NEGATIVE
UROBILINOGEN, URINE: NORMAL
WBC # BLD: 9.5 K/UL (ref 3.5–11.3)
WBC # BLD: ABNORMAL 10*3/UL
WBC UA: ABNORMAL /HPF (ref 0–5)
YEAST: ABNORMAL

## 2022-02-12 PROCEDURE — 2580000003 HC RX 258: Performed by: EMERGENCY MEDICINE

## 2022-02-12 PROCEDURE — 83605 ASSAY OF LACTIC ACID: CPT

## 2022-02-12 PROCEDURE — 86140 C-REACTIVE PROTEIN: CPT

## 2022-02-12 PROCEDURE — 96376 TX/PRO/DX INJ SAME DRUG ADON: CPT

## 2022-02-12 PROCEDURE — 94761 N-INVAS EAR/PLS OXIMETRY MLT: CPT

## 2022-02-12 PROCEDURE — 99283 EMERGENCY DEPT VISIT LOW MDM: CPT

## 2022-02-12 PROCEDURE — G0378 HOSPITAL OBSERVATION PER HR: HCPCS

## 2022-02-12 PROCEDURE — 2580000003 HC RX 258: Performed by: STUDENT IN AN ORGANIZED HEALTH CARE EDUCATION/TRAINING PROGRAM

## 2022-02-12 PROCEDURE — 81001 URINALYSIS AUTO W/SCOPE: CPT

## 2022-02-12 PROCEDURE — 96366 THER/PROPH/DIAG IV INF ADDON: CPT

## 2022-02-12 PROCEDURE — 87086 URINE CULTURE/COLONY COUNT: CPT

## 2022-02-12 PROCEDURE — 80053 COMPREHEN METABOLIC PANEL: CPT

## 2022-02-12 PROCEDURE — 36415 COLL VENOUS BLD VENIPUNCTURE: CPT

## 2022-02-12 PROCEDURE — 6370000000 HC RX 637 (ALT 250 FOR IP): Performed by: STUDENT IN AN ORGANIZED HEALTH CARE EDUCATION/TRAINING PROGRAM

## 2022-02-12 PROCEDURE — 96367 TX/PROPH/DG ADDL SEQ IV INF: CPT

## 2022-02-12 PROCEDURE — 85025 COMPLETE CBC W/AUTO DIFF WBC: CPT

## 2022-02-12 PROCEDURE — 87635 SARS-COV-2 COVID-19 AMP PRB: CPT

## 2022-02-12 PROCEDURE — 6360000002 HC RX W HCPCS: Performed by: STUDENT IN AN ORGANIZED HEALTH CARE EDUCATION/TRAINING PROGRAM

## 2022-02-12 PROCEDURE — 6360000004 HC RX CONTRAST MEDICATION: Performed by: EMERGENCY MEDICINE

## 2022-02-12 PROCEDURE — C9803 HOPD COVID-19 SPEC COLLECT: HCPCS

## 2022-02-12 PROCEDURE — 87040 BLOOD CULTURE FOR BACTERIA: CPT

## 2022-02-12 PROCEDURE — 1200000000 HC SEMI PRIVATE

## 2022-02-12 PROCEDURE — 96365 THER/PROPH/DIAG IV INF INIT: CPT

## 2022-02-12 PROCEDURE — 74177 CT ABD & PELVIS W/CONTRAST: CPT

## 2022-02-12 PROCEDURE — 96375 TX/PRO/DX INJ NEW DRUG ADDON: CPT

## 2022-02-12 PROCEDURE — 6360000002 HC RX W HCPCS: Performed by: EMERGENCY MEDICINE

## 2022-02-12 RX ORDER — POLYETHYLENE GLYCOL 3350 17 G/17G
17 POWDER, FOR SOLUTION ORAL DAILY PRN
Status: DISCONTINUED | OUTPATIENT
Start: 2022-02-12 | End: 2022-02-13 | Stop reason: HOSPADM

## 2022-02-12 RX ORDER — MORPHINE SULFATE 4 MG/ML
4 INJECTION, SOLUTION INTRAMUSCULAR; INTRAVENOUS ONCE
Status: COMPLETED | OUTPATIENT
Start: 2022-02-12 | End: 2022-02-12

## 2022-02-12 RX ORDER — SODIUM CHLORIDE 9 MG/ML
25 INJECTION, SOLUTION INTRAVENOUS PRN
Status: DISCONTINUED | OUTPATIENT
Start: 2022-02-12 | End: 2022-02-13 | Stop reason: HOSPADM

## 2022-02-12 RX ORDER — ROPINIROLE 0.25 MG/1
0.5 TABLET, FILM COATED ORAL NIGHTLY
Status: DISCONTINUED | OUTPATIENT
Start: 2022-02-12 | End: 2022-02-12

## 2022-02-12 RX ORDER — MORPHINE SULFATE 2 MG/ML
2 INJECTION, SOLUTION INTRAMUSCULAR; INTRAVENOUS
Status: DISCONTINUED | OUTPATIENT
Start: 2022-02-12 | End: 2022-02-13 | Stop reason: HOSPADM

## 2022-02-12 RX ORDER — ONDANSETRON 2 MG/ML
4 INJECTION INTRAMUSCULAR; INTRAVENOUS EVERY 6 HOURS PRN
Status: DISCONTINUED | OUTPATIENT
Start: 2022-02-12 | End: 2022-02-13 | Stop reason: HOSPADM

## 2022-02-12 RX ORDER — HYDROXYZINE HYDROCHLORIDE 25 MG/1
25 TABLET, FILM COATED ORAL ONCE
Status: DISCONTINUED | OUTPATIENT
Start: 2022-02-12 | End: 2022-02-13 | Stop reason: HOSPADM

## 2022-02-12 RX ORDER — SODIUM CHLORIDE, SODIUM LACTATE, POTASSIUM CHLORIDE, CALCIUM CHLORIDE 600; 310; 30; 20 MG/100ML; MG/100ML; MG/100ML; MG/100ML
1000 INJECTION, SOLUTION INTRAVENOUS ONCE
Status: COMPLETED | OUTPATIENT
Start: 2022-02-12 | End: 2022-02-12

## 2022-02-12 RX ORDER — ONDANSETRON 4 MG/1
4 TABLET, ORALLY DISINTEGRATING ORAL EVERY 8 HOURS PRN
Status: DISCONTINUED | OUTPATIENT
Start: 2022-02-12 | End: 2022-02-13 | Stop reason: HOSPADM

## 2022-02-12 RX ORDER — ACETAMINOPHEN 325 MG/1
650 TABLET ORAL EVERY 6 HOURS PRN
Status: DISCONTINUED | OUTPATIENT
Start: 2022-02-12 | End: 2022-02-13 | Stop reason: HOSPADM

## 2022-02-12 RX ORDER — HYDROMORPHONE HCL 110MG/55ML
1 PATIENT CONTROLLED ANALGESIA SYRINGE INTRAVENOUS EVERY 4 HOURS PRN
Status: DISCONTINUED | OUTPATIENT
Start: 2022-02-12 | End: 2022-02-13 | Stop reason: HOSPADM

## 2022-02-12 RX ORDER — ACETAMINOPHEN 650 MG/1
650 SUPPOSITORY RECTAL EVERY 6 HOURS PRN
Status: DISCONTINUED | OUTPATIENT
Start: 2022-02-12 | End: 2022-02-13 | Stop reason: HOSPADM

## 2022-02-12 RX ORDER — MORPHINE SULFATE 4 MG/ML
4 INJECTION, SOLUTION INTRAMUSCULAR; INTRAVENOUS
Status: DISCONTINUED | OUTPATIENT
Start: 2022-02-12 | End: 2022-02-13 | Stop reason: HOSPADM

## 2022-02-12 RX ORDER — LORAZEPAM 0.5 MG/1
0.25 TABLET ORAL ONCE
Status: COMPLETED | OUTPATIENT
Start: 2022-02-12 | End: 2022-02-12

## 2022-02-12 RX ORDER — SODIUM CHLORIDE 0.9 % (FLUSH) 0.9 %
5-40 SYRINGE (ML) INJECTION EVERY 12 HOURS SCHEDULED
Status: DISCONTINUED | OUTPATIENT
Start: 2022-02-12 | End: 2022-02-13 | Stop reason: HOSPADM

## 2022-02-12 RX ORDER — MAGNESIUM SULFATE IN WATER 40 MG/ML
2000 INJECTION, SOLUTION INTRAVENOUS PRN
Status: DISCONTINUED | OUTPATIENT
Start: 2022-02-12 | End: 2022-02-13 | Stop reason: HOSPADM

## 2022-02-12 RX ORDER — SODIUM CHLORIDE, SODIUM LACTATE, POTASSIUM CHLORIDE, CALCIUM CHLORIDE 600; 310; 30; 20 MG/100ML; MG/100ML; MG/100ML; MG/100ML
INJECTION, SOLUTION INTRAVENOUS CONTINUOUS
Status: DISCONTINUED | OUTPATIENT
Start: 2022-02-12 | End: 2022-02-13 | Stop reason: HOSPADM

## 2022-02-12 RX ORDER — POTASSIUM CHLORIDE 20 MEQ/1
40 TABLET, EXTENDED RELEASE ORAL PRN
Status: DISCONTINUED | OUTPATIENT
Start: 2022-02-12 | End: 2022-02-13 | Stop reason: HOSPADM

## 2022-02-12 RX ORDER — POTASSIUM CHLORIDE 7.45 MG/ML
10 INJECTION INTRAVENOUS PRN
Status: DISCONTINUED | OUTPATIENT
Start: 2022-02-12 | End: 2022-02-13 | Stop reason: HOSPADM

## 2022-02-12 RX ORDER — SODIUM CHLORIDE 0.9 % (FLUSH) 0.9 %
5-40 SYRINGE (ML) INJECTION PRN
Status: DISCONTINUED | OUTPATIENT
Start: 2022-02-12 | End: 2022-02-13 | Stop reason: HOSPADM

## 2022-02-12 RX ORDER — MORPHINE SULFATE 10 MG/ML
6 INJECTION, SOLUTION INTRAMUSCULAR; INTRAVENOUS ONCE
Status: COMPLETED | OUTPATIENT
Start: 2022-02-12 | End: 2022-02-12

## 2022-02-12 RX ADMIN — LORAZEPAM 0.25 MG: 0.5 TABLET ORAL at 22:07

## 2022-02-12 RX ADMIN — MORPHINE SULFATE 4 MG: 4 INJECTION, SOLUTION INTRAMUSCULAR; INTRAVENOUS at 22:06

## 2022-02-12 RX ADMIN — IOPAMIDOL 75 ML: 755 INJECTION, SOLUTION INTRAVENOUS at 16:32

## 2022-02-12 RX ADMIN — MORPHINE SULFATE 4 MG: 4 INJECTION INTRAVENOUS at 17:42

## 2022-02-12 RX ADMIN — MORPHINE SULFATE 4 MG: 4 INJECTION, SOLUTION INTRAMUSCULAR; INTRAVENOUS at 19:47

## 2022-02-12 RX ADMIN — SODIUM CHLORIDE, POTASSIUM CHLORIDE, SODIUM LACTATE AND CALCIUM CHLORIDE: 600; 310; 30; 20 INJECTION, SOLUTION INTRAVENOUS at 19:53

## 2022-02-12 RX ADMIN — MORPHINE SULFATE 6 MG: 10 INJECTION INTRAVENOUS at 15:52

## 2022-02-12 RX ADMIN — SODIUM CHLORIDE, POTASSIUM CHLORIDE, SODIUM LACTATE AND CALCIUM CHLORIDE 1000 ML: 600; 310; 30; 20 INJECTION, SOLUTION INTRAVENOUS at 16:59

## 2022-02-12 RX ADMIN — VANCOMYCIN HYDROCHLORIDE 1500 MG: 500 INJECTION, POWDER, LYOPHILIZED, FOR SOLUTION INTRAVENOUS at 18:10

## 2022-02-12 RX ADMIN — PIPERACILLIN AND TAZOBACTAM 3375 MG: 3; .375 INJECTION, POWDER, FOR SOLUTION INTRAVENOUS at 17:00

## 2022-02-12 ASSESSMENT — PAIN SCALES - GENERAL
PAINLEVEL_OUTOF10: 7
PAINLEVEL_OUTOF10: 7
PAINLEVEL_OUTOF10: 4
PAINLEVEL_OUTOF10: 9
PAINLEVEL_OUTOF10: 5
PAINLEVEL_OUTOF10: 9
PAINLEVEL_OUTOF10: 7

## 2022-02-12 ASSESSMENT — ENCOUNTER SYMPTOMS
NAUSEA: 0
VOMITING: 0
SHORTNESS OF BREATH: 0
ABDOMINAL PAIN: 1

## 2022-02-12 ASSESSMENT — PAIN DESCRIPTION - DESCRIPTORS: DESCRIPTORS: ACHING

## 2022-02-12 ASSESSMENT — PAIN DESCRIPTION - PAIN TYPE: TYPE: ACUTE PAIN

## 2022-02-12 ASSESSMENT — PAIN DESCRIPTION - LOCATION: LOCATION: ABDOMEN

## 2022-02-12 ASSESSMENT — PAIN DESCRIPTION - ORIENTATION: ORIENTATION: MID

## 2022-02-12 NOTE — ED NOTES
Pt ambulated to the bathroom and instructed on how to obtain a clean catch urine. Pt will take specimen back to room for collection and labeling. Pt will pull call light if assistance is needed.       Nya Pate RN  02/12/22 1523

## 2022-02-12 NOTE — ED PROVIDER NOTES
(NAPROSYN) 375 MG tablet Take 1 tablet by mouth 2 times daily, Disp-12 tablet, R-0             ALLERGIES     Patient has no known allergies. FAMILY HISTORY     History reviewed. No pertinent family history. SOCIAL HISTORY       Social History     Socioeconomic History    Marital status: Single     Spouse name: None    Number of children: None    Years of education: None    Highest education level: None   Occupational History    None   Tobacco Use    Smoking status: Current Every Day Smoker     Packs/day: 1.00     Types: Cigarettes    Smokeless tobacco: Never Used   Vaping Use    Vaping Use: Never used   Substance and Sexual Activity    Alcohol use: No    Drug use: Yes     Types: Marijuana Alpheus Maritza)    Sexual activity: None   Other Topics Concern    None   Social History Narrative    None     Social Determinants of Health     Financial Resource Strain:     Difficulty of Paying Living Expenses: Not on file   Food Insecurity:     Worried About Running Out of Food in the Last Year: Not on file    Libra of Food in the Last Year: Not on file   Transportation Needs:     Lack of Transportation (Medical): Not on file    Lack of Transportation (Non-Medical):  Not on file   Physical Activity:     Days of Exercise per Week: Not on file    Minutes of Exercise per Session: Not on file   Stress:     Feeling of Stress : Not on file   Social Connections:     Frequency of Communication with Friends and Family: Not on file    Frequency of Social Gatherings with Friends and Family: Not on file    Attends Jainism Services: Not on file    Active Member of Clubs or Organizations: Not on file    Attends Club or Organization Meetings: Not on file    Marital Status: Not on file   Intimate Partner Violence:     Fear of Current or Ex-Partner: Not on file    Emotionally Abused: Not on file    Physically Abused: Not on file    Sexually Abused: Not on file   Housing Stability:     Unable to Pay for Housing in the Last Year: Not on file    Number of Places Lived in the Last Year: Not on file    Unstable Housing in the Last Year: Not on file       SCREENINGS         Ara Coma Scale  Eye Opening: Spontaneous  Best Verbal Response: Oriented  Best Motor Response: Obeys commands  [place] Coma Scale Score: 15                     CIWA Assessment  BP: 134/86  Pulse: 101                 PHYSICAL EXAM    (up to 7 for level 4, 8 or more for level 5)     ED Triage Vitals [02/11/22 2202]   BP Temp Temp Source Pulse Resp SpO2 Height Weight   134/86 97.7 °F (36.5 °C) Tympanic 101 18 96 % 5' 9\" (1.753 m) 160 lb (72.6 kg)       Physical Exam  Constitutional:       General: He is not in acute distress. Appearance: He is well-developed. He is not diaphoretic. HENT:      Head: Normocephalic and atraumatic. Eyes:      General:         Right eye: No discharge. Left eye: No discharge. Neck:      Trachea: No tracheal deviation. Cardiovascular:      Rate and Rhythm: Normal rate and regular rhythm. Heart sounds: No murmur heard. No friction rub. Pulmonary:      Effort: Pulmonary effort is normal. No respiratory distress. Breath sounds: No stridor. No wheezing or rales. Chest:      Chest wall: No tenderness. Abdominal:      General: There is no distension. Palpations: Abdomen is soft. There is no mass. Tenderness: There is no abdominal tenderness. There is no guarding or rebound. Musculoskeletal:         General: No tenderness or deformity. Normal range of motion. Cervical back: Normal range of motion. Skin:     General: Skin is warm. Findings: No erythema or rash. Neurological:      Mental Status: He is alert and oriented to person, place, and time.    Psychiatric:         Behavior: Behavior normal.         DIAGNOSTIC RESULTS     EKG: All EKG's are interpreted by the Emergency Department Physician who either signs or Co-signs this chart in the absence of a cardiologist.        RADIOLOGY:   Non-plain film images such as CT, Ultrasound and MRI are read by the radiologist. Plain radiographic images are visualized and preliminarily interpreted by the emergency physician with the below findings:        Interpretation per the Radiologist below, if available at the time of this note:    CT ABDOMEN PELVIS W IV CONTRAST Additional Contrast? None   Final Result   1. Abnormal skin thickening within the region of the umbilicus, suggesting   cellulitis   2. No acute findings within the abdomen or pelvis   3. Prior cholecystectomy and appendectomy               ED BEDSIDE ULTRASOUND:   Performed by ED Physician - none    LABS:  Labs Reviewed   BASIC METABOLIC PANEL W/ REFLEX TO MG FOR LOW K - Abnormal; Notable for the following components:       Result Value    Chloride 108 (*)     All other components within normal limits   HEPATIC FUNCTION PANEL   LIPASE       All other labs were within normal range or not returned as of this dictation. EMERGENCY DEPARTMENT COURSE and DIFFERENTIAL DIAGNOSIS/MDM:   Vitals:    Vitals:    02/11/22 2202   BP: 134/86   Pulse: 101   Resp: 18   Temp: 97.7 °F (36.5 °C)   TempSrc: Tympanic   SpO2: 96%   Weight: 160 lb (72.6 kg)   Height: 5' 9\" (1.753 m)           MDM  Number of Diagnoses or Management Options  Cellulitis of abdominal wall  Diagnosis management comments: 81-year-old male present with concern for abdominal discomfort. Patient's abdominal sample not demonstrate any peritoneal signs imaging was consistent with cellulitis without any abscess. Patient was afebrile and nontoxic-appearing therefore patient was provided oral dose of antibiotics and a prescription for antibiotics. Patient provided extensive return precautions terms of worsening symptoms. At time discharge patient was tolerant p.o. not hypotensive or tachycardic afebrile and otherwise clinically stable.         REASSESSMENT          CRITICAL CARE TIME   Total Critical Care time was  minutes, excluding separately reportable procedures. There was a high probability of clinically significant/life threatening deterioration in the patient's condition which required my urgent intervention. CONSULTS:  None    PROCEDURES:  Unless otherwise noted below, none     Procedures    FINAL IMPRESSION      1. Cellulitis of abdominal wall          DISPOSITION/PLAN   DISPOSITION Decision To Discharge 02/11/2022 11:24:56 PM      PATIENT REFERRED TO:  No follow-up provider specified. DISCHARGE MEDICATIONS:  Discharge Medication List as of 2/11/2022 11:27 PM      START taking these medications    Details   cephALEXin (KEFLEX) 500 MG capsule Take 1 capsule by mouth 4 times daily, Disp-40 capsule, R-0Normal           Controlled Substances Monitoring:     No flowsheet data found.     (Please note that portions of this note were completed with a voice recognition program.  Efforts were made to edit the dictations but occasionally words are mis-transcribed.)    Naresh Lincoln MD (electronically signed)  Attending Emergency Physician            Naresh Lincoln MD  02/12/22 4397

## 2022-02-12 NOTE — ED PROVIDER NOTES
[facility] ED  EMERGENCY DEPARTMENT ENCOUNTER      Pt Name: Virginia Calderon  MRN: [id number]  [place] 1985  Date of evaluation: 2/12/2022  Provider: Jenn Zavaleta MD    CHIEF COMPLAINT       Chief Complaint   Patient presents with    Abdominal Pain     in er last night dx with cellulitis, worse today         HISTORY OF PRESENT ILLNESS   (Location/Symptom, Timing/Onset, Context/Setting, Quality, Duration, Modifying Factors, Severity)  Note limiting factors. Virginia Calderon is a 31 y.o. male who presents to the emergency department for evaluation of abdominal pain. Was seen in the ED last night after having symptoms for 1 day. CT imaging at that time demonstrated the presence of cellulitis focal around the umbilicus but was otherwise unremarkable. He was started on Keflex and discharged home. He presents back today complaining of markedly worsened pain with a focal point just above the umbilicus and now pain extending to the bilateral flanks. Additionally, he notes a fever with a T-max of 100.7 °F this morning which is new for him. He has no other complaints at this time. While the ibuprofen and Tylenol taken earlier today did help resolve the fever he notes it made no impact on his pain    Nursing Notes were reviewed. REVIEW OF SYSTEMS    (2-9 systems for level 4, 10 or more for level 5)     Review of Systems   Constitutional: Positive for fever. HENT: Negative. Respiratory: Negative for shortness of breath. Cardiovascular: Negative for chest pain. Gastrointestinal: Positive for abdominal pain. Negative for nausea and vomiting. Genitourinary: Positive for genital sores (Small area at base of penis from accidentally dropping camila from cigarette. ). Negative for decreased urine volume, difficulty urinating, dysuria, penile pain, penile swelling, scrotal swelling and testicular pain. Except as noted above the remainder of the review of systems was reviewed and negative.  Lack of Transportation (Non-Medical): Not on file   Physical Activity:     Days of Exercise per Week: Not on file    Minutes of Exercise per Session: Not on file   Stress:     Feeling of Stress : Not on file   Social Connections:     Frequency of Communication with Friends and Family: Not on file    Frequency of Social Gatherings with Friends and Family: Not on file    Attends Latter-day Services: Not on file    Active Member of [address] [place] [address] or Organizations: Not on file    Attends Club or Organization Meetings: Not on file    Marital Status: Not on file   Intimate Partner Violence:     Fear of Current or Ex-Partner: Not on file    Emotionally Abused: Not on file    Physically Abused: Not on file    Sexually Abused: Not on file   Housing Stability:     Unable to Pay for Housing in the Last Year: Not on file    Number of [place] in the Last Year: Not on file    Unstable Housing in the Last Year: Not on file       PHYSICAL EXAM    (up to 7 for level 4, 8 or more for level 5)     ED Triage Vitals [02/12/22 1516]   BP Temp Temp src Pulse Resp SpO2 Height Weight   (!) 163/75 98.1 °F (36.7 °C) -- 97 20 98 % -- --       Physical Exam  Vitals and nursing note reviewed. Constitutional:       Comments: Awake and alert. No distress but appears uncomfortable. HENT:      Head: Normocephalic and atraumatic. Eyes:      General: No scleral icterus. Cardiovascular:      Rate and Rhythm: Normal rate and regular rhythm. Heart sounds: No murmur heard. Pulmonary:      Effort: Pulmonary effort is normal. No respiratory distress. Breath sounds: Normal breath sounds. No stridor. No wheezing, rhonchi or rales. Abdominal:      General: Bowel sounds are normal. There is no distension. Palpations: There is no hepatomegaly, splenomegaly, mass or pulsatile mass. Tenderness: There is generalized abdominal tenderness. There is no guarding or rebound. Negative signs include Iniguez's sign. Hernia: No hernia is present. Comments: No crepitus is appreciated across the abdomen or bilateral flanks. Genitourinary:     Pubic Area: No rash. Penis: Normal. No erythema, tenderness, discharge, swelling or lesions. Testes: Normal.         Right: Tenderness or swelling not present. Left: Tenderness or swelling not present. Comments: No scrotal erythema, swelling or tenderness noted. Skin:     General: Skin is warm and dry. Coloration: Skin is not cyanotic, jaundiced, mottled or pale. Neurological:      General: No focal deficit present. Mental Status: He is oriented to person, place, and time. DIAGNOSTIC RESULTS       CT ABDOMEN PELVIS W IV CONTRAST Additional Contrast? None   Final Result   Stable minimal skin thickening in the region the umbilicus. No soft tissue   gas. LABS:  Labs Reviewed   CBC WITH AUTO DIFFERENTIAL - Abnormal; Notable for the following components:       Result Value    Seg Neutrophils 67 (*)     Lymphocytes 22 (*)     All other components within normal limits   URINALYSIS - Abnormal; Notable for the following components:    Bilirubin Urine SMALL (*)     Ketones, Urine 4+ (*)     Specific Gravity, UA >1.030 (*)     All other components within normal limits   MICROSCOPIC URINALYSIS - Abnormal; Notable for the following components:    Bacteria, UA TRACE (*)     Mucus, UA TRACE (*)     All other components within normal limits   CULTURE, URINE   CULTURE, BLOOD 1   CULTURE, BLOOD 1   COVID-19, RAPID   LACTATE, SEPSIS   COMPREHENSIVE METABOLIC PANEL W/ REFLEX TO MG FOR LOW K   C-REACTIVE PROTEIN   LACTATE, SEPSIS       All other labs were within normal range or not returned as of this dictation.     EMERGENCY DEPARTMENT COURSE and DIFFERENTIAL DIAGNOSIS/MDM:   Vitals:    Vitals:    02/12/22 1516   BP: (!) 163/75   Pulse: 97   Resp: 20   Temp: 98.1 °F (36.7 °C)   SpO2: 98%       MDM     ED Course as of 02/12/22 1750   Sat Feb 12, 2022   1537 Given development of fever this morning and what patient describes as rapid worsening of pain as well as increasing area of involvement I am concerned for potential necrotizing fasciitis. Will order repeat CT imaging to further evaluate. Initiating antibiotic therapy with Zosyn and vancomycin. [SR]   3565 CT imaging today demonstrated stable skin thickening with no evidence of necrotizing fasciitis or intra abdominal acute pathology. Given that the patient had marked worsening of his pain as well as development of fever today, including an area of erythema that developed over the course of about an hour while in the emergency department, I am concerned about potential significant of the cellulitis even in the absence of NSTI. As the patient does have pain out of proportion to his exam I feel that he would benefit from inpatient therapy for both IV antibiotics and adequate pain control. At this time there is no indication surgical intervention/consultation. Discussed with Dr. Mickey Oh, hospitalist, to arrange for admission. [SR]      ED Course User Index  [SR] Fernando Espitia MD         FINAL IMPRESSION      1.  Cellulitis, abdominal wall          DISPOSITION/PLAN   DISPOSITION Admitted 02/12/2022 05:37:44 PM      (Please note that portions of this note were completed with a voice recognition program.  Efforts were made to edit the dictations but occasionally words are mis-transcribed.)    Fernando Espitia MD (electronically signed)  Attending Emergency Physician            Fernando Espitia MD  02/12/22 2918

## 2022-02-12 NOTE — ED NOTES
Pt states that he has started to develop red blotches on his abdomen since Dr. Antonette Cade initially saw him and that his abdomen continues to increase in size. Dr Antonette Cade made aware. Per Dr. Mukund Orellana request, CT called and asked to do CT STAT.       Estefanía Del Castillo RN  02/12/22 8894

## 2022-02-13 LAB
CULTURE: NO GROWTH
Lab: NORMAL
SPECIMEN DESCRIPTION: NORMAL

## 2022-02-13 PROCEDURE — G0378 HOSPITAL OBSERVATION PER HR: HCPCS

## 2022-02-13 PROCEDURE — 96376 TX/PRO/DX INJ SAME DRUG ADON: CPT

## 2022-02-13 PROCEDURE — 6360000002 HC RX W HCPCS: Performed by: STUDENT IN AN ORGANIZED HEALTH CARE EDUCATION/TRAINING PROGRAM

## 2022-02-13 RX ADMIN — MORPHINE SULFATE 4 MG: 4 INJECTION, SOLUTION INTRAMUSCULAR; INTRAVENOUS at 01:20

## 2022-02-13 ASSESSMENT — PAIN SCALES - GENERAL: PAINLEVEL_OUTOF10: 7

## 2022-02-13 NOTE — PROGRESS NOTES
Pharmacy Note  Vancomycin Consult    Rosamaria Holt is a 31 y.o. male started on Vancomycin for cellulitis; consult received from Dr. Juanita Stuart to manage therapy. Also receiving the following antibiotics: Zosyn. Patient Active Problem List   Diagnosis    Umbilical pain     Allergies:  Patient has no known allergies. Temp max: 100.7    Recent Labs     02/11/22  2235 02/12/22  1546   BUN 9 9   CREATININE 0.91 0.76   WBC  --  9.5     No intake or output data in the 24 hours ending 02/12/22 1911  Culture Date      Source                       Results  2/12/22  Blood x 2   2/12/22  urine    Ht Readings from Last 1 Encounters:   02/12/22 5' 9\" (1.753 m)        Wt Readings from Last 1 Encounters:   02/12/22 169 lb 8.5 oz (76.9 kg)       Body mass index is 25.04 kg/m². Estimated Creatinine Clearance: 137 mL/min (based on SCr of 0.76 mg/dL). Goal Trough Level: 15 mcg/mL    Assessment/Plan:  Will initiate Vancomycin with a one time loading dose of 1500 mg x1, followed by 1250 mg IV every 12 hours. Timing of trough level will be determined based on culture results, renal function, and clinical response. Thank you for the consult. Will continue to follow. Bob Dietz Pharm. D., BCPS  2/12/22 1922

## 2022-02-13 NOTE — PROGRESS NOTES
Writer and nursing supervisor at bedside. Writer explained the AMA paper. Pt continues to yell at [address] regarding not getting his pain medication soon enough. Pt, writer and nursing supervisor all signed AMA paper. Nursing supervisor attempted to take pt's IV out but, pt ripped IV out himself and handed it to the nursing supervisor. Pt states that he can't believe that he doesn't get a pain script to go home with. Pt then left the room to go meet his ride. Nursing supervisor walked with pt down to ER entrance.

## 2022-02-13 NOTE — PROGRESS NOTES
Pt called and stated he wanted pain medication. Tylenol is all that is order and pt refused it. Writer will call doctor for further orders.

## 2022-02-13 NOTE — PROGRESS NOTES
Writer entered room with pain medication and scheduled antibiotic. Pt was very upset and yelling at [facility]. He states he called out a long time ago for pain medication and he has been waiting. Writer explained that the aide just called and told her. Morphine 4 mg IVP given. After the Morphine was administered pt then states he is leaving and refused the antibiotic. Writer then explained to pt that an AMA paper needed to be singed before leaving. Pt states that's fine and wants the IV taken out. Pt states that he now wants the pain script that ER was originally going to discharge him with before pt decided that he wanted to be admitted. Writer explained to pt that ER never wrote the script since he was admitted instead of being discharged. Writer informed pt that she would go get the [facility] paper and come back. Writer will update supervisor.

## 2022-02-13 NOTE — PROGRESS NOTES
Pt came up from ER to [facility] room 318 and is being admitted with umbilical pain. Pt is A&O x4. Vitals and assessment as charted. Pt stated his pain is a 5 out of 10 in his abdomen. Abdomen is noted to be red around umbilical area. Writer will outline reddened area with marker. Swelling in also noted in the abdominal area. Writer went over plan of care with pt and all questions answered. Pt denies any further needs at this time.  Call light within reach

## 2022-02-13 NOTE — PROGRESS NOTES
Writer called doctor for pain medication and orders received. Writer waiting for pharmacy to verify Morphine.

## 2022-02-14 ENCOUNTER — CARE COORDINATION (OUTPATIENT)
Dept: CARE COORDINATION | Age: 37
End: 2022-02-14

## 2022-02-14 NOTE — CARE COORDINATION
Patient contacted regarding COVID-19 risk. Discussed COVID-19 related testing which was available at this time. Test results were negative. Patient informed of results, if available? Yes. Ambulatory Care Manager contacted the patient by telephone to perform post discharge assessment. Call within 2 business days of discharge: Yes. Verified name and  with patient as identifiers. Provided introduction to self, and explanation of the CTN/ACM role, and reason for call due to risk factors for infection and/or exposure to COVID-19. Symptoms reviewed with patient who verbalized the following symptoms: worsening abd pain    Due to worsening symptoms encounter, ACM advised to return to the ED. Patient called his PCP earlier, and waiting for a return call., routed to provider for escalation. Discussed follow-up appointments. If no appointment was previously scheduled, appointment scheduling offered: Yes. [facility] follow up appointment(s): No future appointments. Non-[facility] follow up appointment(s): none    Non-face-to-face services provided:  Obtained and reviewed discharge summary and/or continuity of care documents  Assessment and support for treatment adherence and medication management-taking meds as directed, returning to ED      Advance Care Planning:   Does patient have an Advance Directive:  not on file. Educated patient about risk for severe COVID-19 due to risk factors according to CDC guidelines. ACM reviewed discharge instructions, medical action plan and red flag symptoms with the patient who verbalized understanding. Discussed COVID vaccination status: No. Education provided on COVID-19 vaccination as appropriate. Discussed exposure protocols and quarantine with CDC Guidelines. Patient was given an opportunity to verbalize any questions and concerns and agrees to contact ACM or health care provider for questions related to their healthcare.     Reviewed and educated patient on any new and changed medications related to discharge diagnosis     Was patient discharged with a pulse oximeter? No Discussed and confirmed pulse oximeter discharge instructions and when to notify provider or seek   emergency care. Patient was in the ED 2/11 with abd pain, dx cellulitis of abd wall. Went back to ED 2/12 with worsening symptoms and he was admitted. He left AMA 2/12. Today. , reports his abd pain is \"worse and spreading\"  Reports, he called his PCP and waiting for call back. ACM encouraged returning to the ED for worsening symptoms, he voiced understanding. ACM provided contact information. Plan for follow-up call in 3-5 days based on severity of symptoms and risk factors.

## 2022-02-17 LAB
CULTURE: NORMAL
CULTURE: NORMAL
Lab: NORMAL
Lab: NORMAL
SPECIMEN DESCRIPTION: NORMAL
SPECIMEN DESCRIPTION: NORMAL

## 2022-02-18 ENCOUNTER — CARE COORDINATION (OUTPATIENT)
Dept: CARE COORDINATION | Age: 37
End: 2022-02-18

## 2022-02-18 NOTE — CARE COORDINATION
2nd outreach for ED follow up  COVID test negative   VM hasn't been set up, unable to leave a message

## 2022-11-09 ENCOUNTER — OFFICE VISIT (OUTPATIENT)
Dept: PRIMARY CARE CLINIC | Age: 37
End: 2022-11-09
Payer: MEDICARE

## 2022-11-09 ENCOUNTER — HOSPITAL ENCOUNTER (OUTPATIENT)
Age: 37
Setting detail: SPECIMEN
Discharge: HOME | End: 2022-11-09
Payer: MEDICARE

## 2022-11-09 VITALS
HEART RATE: 105 BPM | OXYGEN SATURATION: 97 % | TEMPERATURE: 98.7 F | DIASTOLIC BLOOD PRESSURE: 80 MMHG | BODY MASS INDEX: 25.28 KG/M2 | WEIGHT: 171.2 LBS | SYSTOLIC BLOOD PRESSURE: 108 MMHG | RESPIRATION RATE: 18 BRPM

## 2022-11-09 DIAGNOSIS — G25.81 RESTLESS LEG SYNDROME: ICD-10-CM

## 2022-11-09 DIAGNOSIS — Z23 NEED FOR INFLUENZA VACCINATION: ICD-10-CM

## 2022-11-09 DIAGNOSIS — L03.311 ABDOMINAL WALL CELLULITIS: ICD-10-CM

## 2022-11-09 DIAGNOSIS — Z76.89 ENCOUNTER TO ESTABLISH CARE: Primary | ICD-10-CM

## 2022-11-09 DIAGNOSIS — Z13.220 LIPID SCREENING: ICD-10-CM

## 2022-11-09 PROCEDURE — 87205 SMEAR GRAM STAIN: CPT

## 2022-11-09 PROCEDURE — 4004F PT TOBACCO SCREEN RCVD TLK: CPT | Performed by: NURSE PRACTITIONER

## 2022-11-09 PROCEDURE — G8419 CALC BMI OUT NRM PARAM NOF/U: HCPCS | Performed by: NURSE PRACTITIONER

## 2022-11-09 PROCEDURE — 90674 CCIIV4 VAC NO PRSV 0.5 ML IM: CPT | Performed by: NURSE PRACTITIONER

## 2022-11-09 PROCEDURE — 99205 OFFICE O/P NEW HI 60 MIN: CPT | Performed by: NURSE PRACTITIONER

## 2022-11-09 PROCEDURE — G8427 DOCREV CUR MEDS BY ELIG CLIN: HCPCS | Performed by: NURSE PRACTITIONER

## 2022-11-09 PROCEDURE — 87070 CULTURE OTHR SPECIMN AEROBIC: CPT

## 2022-11-09 PROCEDURE — G8482 FLU IMMUNIZE ORDER/ADMIN: HCPCS | Performed by: NURSE PRACTITIONER

## 2022-11-09 PROCEDURE — 90471 IMMUNIZATION ADMIN: CPT | Performed by: NURSE PRACTITIONER

## 2022-11-09 RX ORDER — SULFAMETHOXAZOLE AND TRIMETHOPRIM 800; 160 MG/1; MG/1
1 TABLET ORAL 2 TIMES DAILY
Qty: 20 TABLET | Refills: 0 | Status: SHIPPED | OUTPATIENT
Start: 2022-11-09 | End: 2022-11-19

## 2022-11-09 RX ORDER — ROPINIROLE 0.25 MG/1
0.25 TABLET, FILM COATED ORAL NIGHTLY PRN
COMMUNITY

## 2022-11-09 SDOH — ECONOMIC STABILITY: FOOD INSECURITY: WITHIN THE PAST 12 MONTHS, YOU WORRIED THAT YOUR FOOD WOULD RUN OUT BEFORE YOU GOT MONEY TO BUY MORE.: NEVER TRUE

## 2022-11-09 SDOH — ECONOMIC STABILITY: FOOD INSECURITY: WITHIN THE PAST 12 MONTHS, THE FOOD YOU BOUGHT JUST DIDN'T LAST AND YOU DIDN'T HAVE MONEY TO GET MORE.: NEVER TRUE

## 2022-11-09 ASSESSMENT — PATIENT HEALTH QUESTIONNAIRE - PHQ9
SUM OF ALL RESPONSES TO PHQ QUESTIONS 1-9: 2
1. LITTLE INTEREST OR PLEASURE IN DOING THINGS: 0
SUM OF ALL RESPONSES TO PHQ QUESTIONS 1-9: 2
2. FEELING DOWN, DEPRESSED OR HOPELESS: 2
SUM OF ALL RESPONSES TO PHQ QUESTIONS 1-9: 2
SUM OF ALL RESPONSES TO PHQ QUESTIONS 1-9: 2
SUM OF ALL RESPONSES TO PHQ9 QUESTIONS 1 & 2: 2

## 2022-11-09 ASSESSMENT — ENCOUNTER SYMPTOMS
ABDOMINAL PAIN: 1
NAUSEA: 0
ABDOMINAL DISTENTION: 0
ALLERGIC/IMMUNOLOGIC NEGATIVE: 1
RESPIRATORY NEGATIVE: 1
DIARRHEA: 0
EYES NEGATIVE: 1
VOMITING: 0

## 2022-11-09 ASSESSMENT — SOCIAL DETERMINANTS OF HEALTH (SDOH): HOW HARD IS IT FOR YOU TO PAY FOR THE VERY BASICS LIKE FOOD, HOUSING, MEDICAL CARE, AND HEATING?: NOT HARD AT ALL

## 2022-11-09 NOTE — PROGRESS NOTES
[facility]  Kennard Prader, [address]  [address]  Dept: [phone]  Dept Fax: [phone]      Name: Patria Webber  : 1985         Chief Complaint:     Chief Complaint   Patient presents with    New Patient     Establish care. No previous PCP, teledoc. Abdominal Pain     Patient c/o stomach pain at incision site of appendix removal. Off and on but starting to give him issues 6 days ago. Patient notices drainage coming through his belly button. History of Present Illness:      Patria Webber is a 30 y.o.  male who presents with New Patient (Establish care. No previous PCP, teledoc. ) and Abdominal Pain (Patient c/o stomach pain at incision site of appendix removal. Off and on but starting to give him issues 6 days ago. Patient notices drainage coming through his belly button. )    Christoph Bates is here today to establish care. 6 years ago he had his gallbladder and appendix out. After that he would get a \"knot\" in his abdomen that would hurt and then go away. The last time this happened ( 24) he states it busted inside and drained and drained out his belly button and was hospitalized for sepsis. He states it is now flaring up again for the first time. This started on Friday. He has been doing telehealth for antibiotics. He had some Keflex left over and took 2 of them at home. He states this is the first flare up since February. He denies fever but been taking Ibuprofen around the clock for the pain. No pain with eating. No vomiting. He has been pouring peroxide in his belly button. He states he woke up  morning and his belly button was filled with foul smelling drainage. He had a red ring around his abdomen on Friday and Saturday then butsted Saturday night in his sleep.  the ring was gone. His abdomen in tender to touch.     He states he has a history of RLS and states any time he has an antihistamine it worsens. He has been taking requip that works. He is a  and has had DOT physicals yearly. He states he does drink 3 pots of coffee a day. He is a daily smoker. Past Medical History:     Past Medical History:   Diagnosis Date    Gallstones     Kidney stones     RLS (restless legs syndrome)       Reviewed all health maintenance requirements and ordered appropriate tests  Health Maintenance Due   Topic Date Due    Depression Screen  Never done       Past Surgical History:     Past Surgical History:   Procedure Laterality Date    APPENDECTOMY      CHOLECYSTECTOMY          Medications:       Prior to Admission medications    Medication Sig Start Date End Date Taking? Authorizing Provider   rOPINIRole (REQUIP) 0.25 MG tablet Take 0.25 mg by mouth nightly as needed   Yes Historical Provider, MD   sulfamethoxazole-trimethoprim (BACTRIM DS;SEPTRA DS) 800-160 MG per tablet Take 1 tablet by mouth 2 times daily for 10 days 11/9/22 11/19/22 Yes JMAES Stringer - CNP        Allergies:       Patient has no known allergies. Social History:     Tobacco:    reports that he has been smoking cigarettes. He started smoking about 18 years ago. He has a 18.00 pack-year smoking history. He has never used smokeless tobacco.  Alcohol:      reports no history of alcohol use. Drug Use:  reports current drug use. Drug: Marijuana Zheng Spina). Family History:     No family history on file. Review of Systems:     Positive and Negative as described in HPI    Review of Systems   Constitutional: Negative. Negative for fever. HENT: Negative. Eyes: Negative. Respiratory: Negative. Cardiovascular: Negative. Gastrointestinal:  Positive for abdominal pain. Negative for abdominal distention, diarrhea, nausea and vomiting. Endocrine: Negative. Genitourinary: Negative. Musculoskeletal: Negative. Skin:  Positive for wound. Allergic/Immunologic: Negative. Neurological: Negative. Hematological: Negative. Psychiatric/Behavioral: Negative. Physical Exam:   Vitals:  /80   Pulse (!) 105   Temp 98.7 °F (37.1 °C) (Temporal)   Resp 18   Wt 171 lb 3.2 oz (77.7 kg)   SpO2 97%   BMI 25.28 kg/m²     Physical Exam  Vitals and nursing note reviewed. Constitutional:       General: He is not in acute distress. Appearance: Normal appearance. He is normal weight. HENT:      Head: Normocephalic. Right Ear: External ear normal.      Left Ear: External ear normal.      Nose: Nose normal.      Mouth/Throat:      Mouth: Mucous membranes are moist.      Pharynx: Oropharynx is clear. Eyes:      Extraocular Movements: Extraocular movements intact. Pupils: Pupils are equal, round, and reactive to light. Cardiovascular:      Rate and Rhythm: Normal rate and regular rhythm. Heart sounds: Normal heart sounds. Pulmonary:      Effort: Pulmonary effort is normal.      Breath sounds: Normal breath sounds. Abdominal:      General: Abdomen is flat. Palpations: Abdomen is soft. There is mass (soft tender nodule). Tenderness: There is abdominal tenderness in the periumbilical area. Comments: No drainage from umbilicus at this time   Musculoskeletal:         General: Normal range of motion. Cervical back: Normal range of motion. Skin:     General: Skin is warm. Capillary Refill: Capillary refill takes less than 2 seconds. Neurological:      General: No focal deficit present. Mental Status: He is alert and oriented to person, place, and time. Psychiatric:         Mood and Affect: Mood normal.         Behavior: Behavior normal.         Thought Content:  Thought content normal.         Judgment: Judgment normal.       Data:     Lab Results   Component Value Date/Time     02/12/2022 03:46 PM    K 4.0 02/12/2022 03:46 PM     02/12/2022 03:46 PM    CO2 20 02/12/2022 03:46 PM    BUN 9 02/12/2022 03:46 PM    CREATININE 0.76 02/12/2022 03:46 PM    GLUCOSE 81 02/12/2022 03:46 PM    PROT 7.2 02/12/2022 03:46 PM    LABALBU 4.5 02/12/2022 03:46 PM    BILITOT 0.50 02/12/2022 03:46 PM    ALKPHOS 87 02/12/2022 03:46 PM    AST 17 02/12/2022 03:46 PM    ALT 16 02/12/2022 03:46 PM     Lab Results   Component Value Date/Time    WBC 9.5 02/12/2022 03:46 PM    RBC 4.89 02/12/2022 03:46 PM    HGB 15.4 02/12/2022 03:46 PM    HCT 46.4 02/12/2022 03:46 PM    MCV 94.9 02/12/2022 03:46 PM    MCH 31.5 02/12/2022 03:46 PM    MCHC 33.2 02/12/2022 03:46 PM    RDW 12.6 02/12/2022 03:46 PM     02/12/2022 03:46 PM    MPV 9.2 02/12/2022 03:46 PM     No results found for: TSH  No results found for: CHOL, LDL, HDL, PSA, LABA1C    Assessment/Plan:      Diagnosis Orders   1. Encounter to establish care  CBC    Basic Metabolic Panel    AST    ALT      2. Abdominal wall cellulitis  Culture, Wound Aerobic Only    sulfamethoxazole-trimethoprim (BACTRIM DS;SEPTRA DS) 800-160 MG per tablet    [facility], General Surgery, [place]    AST    ALT      3. Restless leg syndrome        4. Lipid screening  Lipid Panel      5. Need for influenza vaccination  Influenza, FLUCELVAX, (age 10 mo+), IM, Preservative Free, 0.5 mL        Attempted to get wound culture from umbilicus with minimal if any drainage on swab due to patient showering right before appointment. Swab sent home with patient to collect if umbilicus drains through the evening. Bactrim as prescribed. Routine labs ordered and will call with results[facility] and [facility] to get patient an appointment tomorrow. Discussed signs and symptoms to go to ER. Will continue to monitor. Time spent counseling patient on smoking cessation. Unable to quit at this time. 1.  Ana Singer received counseling on the following healthy behaviors: nutrition, exercise, medication adherence, and tobacco cessation  2. Patient given educational materials - see patient instructions  3.   Was a self-tracking handout given in paper form or via Phoneplust? No  If yes, see orders or list here. 4.  Discussed use, benefit, and side effects of prescribed medications. Barriers to medication compliance addressed. All patient questions answered. Pt voiced understanding. 5.  Reviewed prior labs and health maintenance  6. Continue current medications, diet and exercise. Completed Refills   Requested Prescriptions     Signed Prescriptions Disp Refills    sulfamethoxazole-trimethoprim (BACTRIM DS;SEPTRA DS) 800-160 MG per tablet 20 tablet 0     Sig: Take 1 tablet by mouth 2 times daily for 10 days         Return in about 1 month (around 12/9/2022).

## 2022-11-09 NOTE — PATIENT INSTRUCTIONS
SURVEY:     You may be receiving a survey from [employer] regarding your visit today. Please complete the survey to enable us to provide the highest quality of care to you and your family. If you cannot score us a very good on any question, please call the office to discuss how we could have made your experience a very good one.      Thank you,    Anahi Trejo, APRN-CNP  Sonia Vernon, APRN-CNP  Price Sheppard, HANNAH Brooke, BRODIE Suazo, BRODIE Vazquez, CMA  Alessandra, RAYMOND Gay, PM

## 2022-11-09 NOTE — PROGRESS NOTES
Vaccine Information Sheet, \"Influenza - Inactivated\"  given to Lui Deleon, or parent/legal guardian of  Lui Deleon and verbalized understanding. Patient responses:    Have you ever had a reaction to a flu vaccine? No  Are you able to eat eggs without adverse effects? Yes  Do you have any current illness? No  Have you ever had Guillian Carpenter Syndrome? No    Flu vaccine given per order. Please see immunization tab.

## 2022-11-10 LAB
CULTURE: ABNORMAL
DIRECT EXAM: ABNORMAL
DIRECT EXAM: ABNORMAL
SPECIMEN DESCRIPTION: ABNORMAL

## 2022-11-11 ENCOUNTER — OFFICE VISIT (OUTPATIENT)
Dept: SURGERY | Age: 37
End: 2022-11-11
Payer: MEDICARE

## 2022-11-11 ENCOUNTER — TELEPHONE (OUTPATIENT)
Dept: PRIMARY CARE CLINIC | Age: 37
End: 2022-11-11

## 2022-11-11 DIAGNOSIS — Q64.4: Primary | ICD-10-CM

## 2022-11-11 PROCEDURE — 99203 OFFICE O/P NEW LOW 30 MIN: CPT | Performed by: SURGERY

## 2022-11-11 PROCEDURE — 4004F PT TOBACCO SCREEN RCVD TLK: CPT | Performed by: SURGERY

## 2022-11-11 PROCEDURE — G8482 FLU IMMUNIZE ORDER/ADMIN: HCPCS | Performed by: SURGERY

## 2022-11-11 PROCEDURE — G8419 CALC BMI OUT NRM PARAM NOF/U: HCPCS | Performed by: SURGERY

## 2022-11-11 PROCEDURE — G8427 DOCREV CUR MEDS BY ELIG CLIN: HCPCS | Performed by: SURGERY

## 2022-11-11 NOTE — PROGRESS NOTES
GENERAL SURGERY CONSULTATION      Patient's Name/ Date of Birth/ Gender: Belkis Urban / 1985 (30 y.o.) / male     PCP: JAMES Alfaro CNP  Referring:     History of present Illness:  Patient is a pleasant 30 y.o. male  kindly referred by JAMES Alfaro CNP. He is here withhis wife. He has chronic intermittent umbilical drainage that has bothered him for years. He had laparoscopic surgery in [place] years ago and then developed umbilical drainage later from the port site at umbilicus. He says one time he pulled out a piece of tubular plastic which sounds like a bit of suture. No op notes. No fevers. He has had several imaging studies and seen several specialists or possible hernia, for cyst, abscess, etc. When he takes antibiotics it gets better. Then it will swell up and drain. It causes pain and discomfort and frustration. In February of this year the pain and drainage was concerning enough to go to the ER in [place] and ER doctor recommended admission with IV antibiotics but he left AMA, he says he was given a sedative that made him loopy and anxious which is why he left. Progress Notes  - documented in this encounter  Marcie Nina, DO - 12/02/2020 1:15 PM EST  Formatting of this note is different from the original.  Images from the original note were not included. [place] [id number] [facility]  [address] [place] [id number]  Phone: [phone]  Fax: [phone]     CONSULT NOTE     Bony Loyola 37 y.o. 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 He felt something pop and then he went to the emergency room where the ER doctor supposedly push something back in.  He can stand up straight and he feels like there is constant pressure on the abdominal wall. He had a CT scan of the abdomen performed as well as an ultrasound with findings below. He was transferred from [facility] to [facility] and admitted to the hospital for observation seen by surgery and was nonsurgical. He was believed to have a repeat rectus abdominus muscle with a small hematoma by ultrasound. He admits to nausea but no vomiting he rates the pain as a 6/10 at all times but does not appear to be in any acute distress. He has been it is Rodriguez's Patel pain into his penis which is similar to his presentation on November 17th. He was seen by Urology due to history of kidney stones and gross hematuria and was recommended a follow-up for an outpatient cystoscopy which he has not done. He smokes tobacco daily and he was told that tobacco was the 1. Cause of bladder cancer. He can not sleep at night. He has not taken any time off from strenuous activity in continues to perform physical exertion all activities. Reason for Exam    umbilical hernia; Abdominal pain, acute, nonlocalized   PACS Images Tesha Ybarra)     Show images for CT abdomen and pelvis with contrast   Interpretation Summary    STUDY: ABDOMEN AND PELVIS CT WITH CONTRAST      CLINICAL HISTORY: Umbilical hernia     COMPARISON: 11/22/2019     TECHNIQUE: Routine CT abdomen and pelvis was performed following the uneventful administration of 100 cc Omnipaque 300. Axial, coronal and sagittal reformatted images as well as delayed excretory phase images were obtained and reviewed. FINDINGS:     Visualized lung bases are unremarkable. Status post cholecystectomy. The liver, spleen, adrenal glands, pancreas, and kidneys are unremarkable. No enlarged abdominal or pelvic lymph nodes. The bladder is unremarkable. Moderate amount of stool in the colon. Prior appendectomy. The small and   large bowel are of normal caliber with no evidence of bowel wall thickening.  The bladder is unremarkable. No free fluid in the abdomen or pelvis. No free intraperitoneal air. Visualized body wall structures are unremarkable. No acute osseous abnormalities or aggressive osseous lesions. IMPRESSION:     No acute abnormalities in the abdomen/pelvis. Specifically, no umbilical hernia or acute periumbilical abnormality identified. All CT scans at this facility use dose modulation, iterative reconstruction, and/or weight based dosing when appropriate to reduce radiation dose to as low as reasonably achievable. Workstation:[id number]     Finalized by Adriana Olvera MD on 11/15/2020 9:01 PM     Reason for Exam    Severe periumbilical pain, notable fluid on bedside US without fat or other intraperitoneal contents. Suspect diastasis recti.    PACS Images Davida Jeter)     Show images for Ultrasound abdomen limited   Interpretation Summary    HISTORY AND/OR [facility] NOTES  Soft Tissue ultrasound  History: patient was lifting in back of his truck and then had severe abdominal pain and lump just above umbilicus develop 13 hours ago        PROCEDURE  Ultrasound of the abdominal wall     COMPARISON  CT from November 15        FINDINGS     Grayscale and color Doppler images and cine mode images submitted over the periumbilical and supraumbilical region     Corresponding to the area of patient's pain, there is a roughly 3 cm complex fluid collection or hematoma in the subcutaneous fat     This appears to be just superficial to and superior to the umbilicus at the midline     There is suggestion of a tear along the midline linea alba region with separation of up to about 1.5 cm     There is no visible protrusion of bowel or fluid from the peritoneum     The small fat-containing right greater than left umbilical hernia are not imaged on this examination       IMPRESSION:     Findings most consistent with a tear of the midline rectus abdominis or linea alba with small separation of about 1.5 cm and some organized complex fluid superficial to that, presumed hematoma. Infection or phlegmon is considered less likely in the absence of suspicious clinical factors     There is no visible protrusion of bowel or peritoneal contents     There is right greater than left fat-containing inguinal hernia which is not further imaged on this examination        -----------------------------------------------------------------------        Workstation:[id number]     Finalized by Janene Jacob MD on 11/16/2020 8:41 AM     · MEDICATION      Current Outpatient Medications:    acetaminophen-codeine (TYLENOL #3) 300-30 mg per tablet, , Disp: , Rfl:    rOPINIRole (REQUIP) 0.25 mg tablet, Take 0.25 mg by mouth nightly as needed. , Disp: , Rfl:    acetaminophen (TYLENOL) 500 mg tablet, Take 1,000 mg by mouth every 6 (six) hours as needed for pain., Disp: , Rfl:    ibuprofen (ADVIL,MOTRIN) 600 mg tablet, Take 600 mg by mouth every 6 (six) hours as needed for pain., Disp: , Rfl:    pantoprazole (PROTONIX) 40 mg EC tablet, Take 1 tablet (40 mg total) by mouth every morning before breakfast for 14 days.  (Patient not taking: Reported on 12/2/2020 ), Disp: 14 tablet, Rfl: 0    · ALLERGY  No Known Allergies    · MEDICAL HISTORY   Past Medical History:   Diagnosis Date    Kidney stones   usually get them once a yr     · SURGICAL HISTORY    Past Surgical History:   Procedure Laterality Date    APPENDECTOMY    CHOLECYSTECTOMY     · SOCIAL HISTORY    Social History     Socioeconomic History    Marital status:    Spouse name: Not on file    Number of children: Not on file    Years of education: Not on file    Highest education level: Not on file   Occupational History    Not on file   Social Needs    Financial resource strain: Not on file    Food insecurity   Worry: Not on file   Inability: Not on file    Transportation needs   Medical: Not on file   Non-medical: Not on file   Tobacco Use    Smoking status: Current Every Day Smoker   Packs/day: 0.50 Types: Cigarettes    Smokeless tobacco: Never Used   Substance and Sexual Activity    Alcohol use: Not Currently    Drug use: Never    Sexual activity: Defer   Lifestyle    Physical activity   Days per week: Not on file   Minutes per session: Not on file    Stress: Not on file   Relationships    Social connections   Talks on phone: Not on file   Gets together: Not on file   Attends Scientologist service: Not on file   Active member of club or organization: Not on file   Attends meetings of clubs or organizations: Not on file   Relationship status: Not on file    Intimate partner violence   Fear of current or ex partner: Not on file   Emotionally abused: Not on file   Physically abused: Not on file   Forced sexual activity: Not on file   Other Topics Concern    Not on file   Social History Narrative    Not on file     · FAMILY HISTORY  History reviewed. No pertinent family history. · REVIEW OF SYSTEMS:   · Constitutional: Denies fevers, denies recent illnesses. · Eyes: Denies any vision changes. · ENT: Denies any throat pain. · Neck: Denies any neck pain. · Cardiovascular denies chest pain. Denies palpitations. · Respiratory: Denies shortness of breath, denies cough, denies history of asthma or any other pulmonary illnesses. · Gastrointestinal: See chief complaint  · Genitourinary : Pain and penis as above with history of gross hematuria; negative for dysuria urinary frequency or urgency. Denies any history of STDs   · Musculoskeletal: Negative for extremity pains or joint discomfort. · Neurologic: No change in sensation or paresthesias or history of seizure disorder   · skin: No rashes. · Hematologic: No anemia. No purpura. No petechiae and no prolonged or excessive bleeding  · Allergic and immunologic: No pruritus. No swelling. · Endocrine: No unexplained weight loss. No polydipsia. No polyuria. No polyphagia. · PHYSICAL EXAM    Constitutional: He is oriented to person, place, and time.  Vital signs are normal. He appears well-developed and well-nourished. HEENT: Head: Normocephalic and atraumatic. Eyes: Conjunctivae, EOM and lids are normal.   Neck: Trachea normal. Neck supple. No thyroid mass present. Cardiovascular: Normal rate and regular rhythm. Pulmonary/Chest: Effort normal and breath sounds normal.   Abdominal: Soft. Normal appearance. He exhibits no distension and no mass. There is no hepatosplenomegaly or splenomegaly. There is negative Iniguez's sign. No hernia. Tenderness to palpation just above the umbilicus in the region of the hematoma by ultrasound. Musculoskeletal: Normal range of motion. Lymphadenopathy:   He has no cervical adenopathy. He has no axillary adenopathy. Right: Positive right lymph node 1 cm inguinal and no supraclavicular adenopathy present. Left: No inguinal and no supraclavicular adenopathy present. Neurological: He is alert and oriented to person, place, and time. Skin: Skin is warm, dry and intact. Psychiatric: He has a normal mood and affect. His speech is normal and behavior is normal. Cognition and memory are normal.     · IMPRESSION  1. Abdominal wall strain and hematoma post rectus abdominus tear as demonstrated by ultrasound and no hernia by CT scan  2. Tobacco abuse  3. Gross hematuria with history of kidney stones  4. Lymphadenopathy right groin? Etiology    · ASSESSMENT & PLAN  1. Refrain from lifting or straining and sex for at least 2 weeks and heat or ice to the area p.r.n. whichever feels better  2. Return to Urology for cystoscopy as previously recommended rule out bladder cancer due to history of tobacco use  3. Return to office in 2 weeks for recheck     He and his wife understood all the above. There is nothing surgical at this time. [facility]. We will make referral to Urology again.       Larry Olvera DO       Past Medical History:  has a past medical history of Gallstones, Kidney stones, and RLS (restless legs syndrome). Past Surgical History:   Past Surgical History:   Procedure Laterality Date    APPENDECTOMY      CHOLECYSTECTOMY      done beofre 2016       Social History:  reports that he has been smoking cigarettes. He started smoking about 18 years ago. He has a 18.00 pack-year smoking history. He has never used smokeless tobacco. He reports current drug use. Drug: Marijuana Donna Forte). He reports that he does not drink alcohol. Family History: family history is not on file. Review of Systems:   General: Completed and, except as mentioned above, was negative or noncontributory  Psychological:  Completed and, except as mentioned above, was negative or noncontributory  Ophthalmic:  Completed and, except as mentioned above, was negative or noncontributory  ENT:  Completed and, except as mentioned above, was negative or noncontributory  Allergy and Immunology:  Completed and, except as mentioned above, was negative or noncontributory  Hematological and Lymphatic:  Completed and, except as mentioned above, was negative or noncontributory  Endocrine: Completed and, except as mentioned above, was negative or noncontributory  Breast:  Completed and, except as mentioned above, was negative or noncontributory  Respiratory:  Completed and, except as mentioned above, was negative or noncontributory  Cardiovascular:  Completed and, except as mentioned above, was negative or noncontributory  Gastrointestinal: Completed and, except as mentioned above, was negative or noncontributory  Genito-Urinary:  Completed and, except as mentioned above, was negative or noncontributory  Musculoskeletal:  Completed and, except as mentioned above, was negative or noncontributory  Neurological:  Completed and, except as mentioned above, was negative or noncontributory  Dermatological:  Completed and, except as mentioned above, was negative or noncontributory    Allergies: Patient has no known allergies.     Current Meds:  Current Outpatient Medications:     rOPINIRole (REQUIP) 0.25 MG tablet, Take 0.25 mg by mouth nightly as needed, Disp: , Rfl:     sulfamethoxazole-trimethoprim (BACTRIM DS;SEPTRA DS) 800-160 MG per tablet, Take 1 tablet by mouth 2 times daily for 10 days, Disp: 20 tablet, Rfl: 0    Physical Exam:  Vital signs and Nurse's note reviewed. BP (!) 137/93   Pulse 64   Resp 17   Ht 5' 9\" (1.753 m)   Wt 171 lb (77.6 kg)   BMI 25.25 kg/m²    height is 5' 9\" (1.753 m) and weight is 171 lb (77.6 kg). His blood pressure is 137/93 (abnormal) and his pulse is 64. His respiration is 17. Gen:  A&Ox3, NAD. Pleasant and cooperative. HEENT: PERRLA, EOMI, no scleral icterus  Neck:  no goiter  CVS: Regular rate and rhythm  Resp: Good bilateral air entry, no active wheezing, no labored breathing  Abd: soft, non-tender, non-distended, bowel sounds present, currently the umbilicus looks normal he says it is because he was on recent antibiotics and this is usually the pattern. He has no pictures of it.    Ext: Moves all extremities, no gross focal motor deficits  Skin: No erythema or ulcerations     Labs:   Lab Results   Component Value Date/Time    WBC 9.5 02/12/2022 03:46 PM    HGB 15.4 02/12/2022 03:46 PM    HCT 46.4 02/12/2022 03:46 PM    MCV 94.9 02/12/2022 03:46 PM     02/12/2022 03:46 PM     Lab Results   Component Value Date/Time     02/12/2022 03:46 PM    K 4.0 02/12/2022 03:46 PM     02/12/2022 03:46 PM    CO2 20 02/12/2022 03:46 PM    BUN 9 02/12/2022 03:46 PM    CREATININE 0.76 02/12/2022 03:46 PM    GLUCOSE 81 02/12/2022 03:46 PM    CALCIUM 9.7 02/12/2022 03:46 PM     Lab Results   Component Value Date/Time    ALKPHOS 87 02/12/2022 03:46 PM    ALT 16 02/12/2022 03:46 PM    AST 17 02/12/2022 03:46 PM    PROT 7.2 02/12/2022 03:46 PM    BILITOT 0.50 02/12/2022 03:46 PM    BILIDIR <0.08 02/11/2022 10:35 PM    LABALBU 4.5 02/12/2022 03:46 PM     No results found for: AMYLASE  Lab Results   Component Value Date/Time    LIPASE 43 02/11/2022 10:35 PM     No results found for: INR    Radiologic Studies:    Narrative   EXAMINATION:   CT OF THE ABDOMEN AND PELVIS WITH CONTRAST 2/12/2022 4:31 pm       TECHNIQUE:   CT of the abdomen and pelvis was performed with the administration of   intravenous contrast. Multiplanar reformatted images are provided for review. Dose modulation, iterative reconstruction, and/or weight based adjustment of   the mA/kV was utilized to reduce the radiation dose to as low as reasonably   achievable. COMPARISON:   02/11/2022       HISTORY:   ORDERING SYSTEM PROVIDED HISTORY: Worsening abdominal pain, now with fever -   concern for developing nec fasc   TECHNOLOGIST PROVIDED HISTORY:   Worsening abdominal pain, now with fever - concern for developing nec fasc       Decision Support Exception - unselect if not a suspected or confirmed   emergency medical condition->Emergency Medical Condition (MA)       FINDINGS:   Lower Chest: Visualized portions of the lower thorax are unremarkable. Organs: Liver, spleen, pancreas, adrenal glands, and kidneys appear   unremarkable. Gallbladder surgically absent. GI/Bowel: No bowel obstruction. Appendix has been removed. Pelvis: Urinary bladder decompressed. Unremarkable prostate. Peritoneum/Retroperitoneum: No free air, fluid, or lymphadenopathy. Bones/Soft Tissues: No acute osseous abnormality. Stable minimal skin   thickening in the region of the umbilicus. No soft tissue gas. Impression   Stable minimal skin thickening in the region the umbilicus. No soft tissue   gas. EXAMINATION:   CT OF THE ABDOMEN AND PELVIS WITH CONTRAST 2/11/2022 5:57 pm       TECHNIQUE:   CT of the abdomen and pelvis was performed with the administration of   intravenous contrast. Multiplanar reformatted images are provided for review.    Dose modulation, iterative reconstruction, and/or weight based adjustment of   the mA/kV was utilized to reduce the radiation dose to as low as reasonably   achievable. COMPARISON:   October 28, 2019       HISTORY:   ORDERING SYSTEM PROVIDED HISTORY: abdominal pain   TECHNOLOGIST PROVIDED HISTORY:       abdominal pain   Decision Support Exception - unselect if not a suspected or confirmed   emergency medical condition->Emergency Medical Condition (MA)       FINDINGS:   Lower Chest: The lung bases are clear. Organs: The liver, spleen, pancreas, adrenal glands, and kidneys demonstrate   no acute abnormality. The gallbladder is surgically absent. GI/Bowel: A small hiatal hernia is present. Small bowel appears normal   without evidence of obstruction. Patient is status post prior appendectomy. No focal inflammatory change or wall thickening is present involving the   large bowel. Pelvis: Urinary bladder and prostate gland appear normal.       Peritoneum/Retroperitoneum: No free fluid or free air is present within the   abdomen or pelvis. No aneurysm formation is present. Scattered, benign   appearing mesenteric lymph nodes are again noted. Bones/Soft Tissues: No acute osseous abnormality is present. Soft tissue   thickening is present along the course of the umbilicus, suggesting   cellulitis. Impression   1. Abnormal skin thickening within the region of the umbilicus, suggesting   cellulitis   2. No acute findings within the abdomen or pelvis   3. Prior cholecystectomy and appendectomy           2019:    EXAMINATION:   CT OF THE ABDOMEN AND PELVIS WITHOUT CONTRAST 10/28/2019 9:18 am       TECHNIQUE:   CT of the abdomen and pelvis was performed without the administration of   intravenous contrast. Multiplanar reformatted images are provided for review. Dose modulation, iterative reconstruction, and/or weight based adjustment of   the mA/kV was utilized to reduce the radiation dose to as low as reasonably   achievable.        COMPARISON:   Abdomen and pelvis CT dated 10/04/2016       HISTORY:   ORDERING SYSTEM PROVIDED HISTORY: right flank pain, hematuria   TECHNOLOGIST PROVIDED HISTORY:       right flank pain, hematuria       FINDINGS:   Lower Chest: Normal aeration of the lung bases. Heart is normal in size. No   pericardial effusion. Organs: Liver, pancreas, spleen, bilateral adrenal glands are unremarkable. Bilateral kidneys and ureters are unremarkable. GI/Bowel: Stomach, small and large bowel loops are grossly unremarkable. Pelvis: Urinary bladder is unremarkable. No lymphadenopathy. No soft   tissue masses or abnormal fluid collections. Peritoneum/Retroperitoneum: No free intraperitoneal fluid or air. No   abdominal aortic aneurysm. No retroperitoneal lymphadenopathy. Bones/Soft Tissues: No lytic or blastic lesions in all visualized osseous   structures. Impression   No nephrolithiasis or hydronephrosis. No acute findings. FINAL REPORT       EXAM:  CT ABDOMEN PELVIS WO IV CONTRAST       HISTORY:  right flank pain, hematuria history cholecystectomy       TECHNIQUE:  Unenhanced stone protocol CT of the abdomen and pelvis at 3.0 millimeter axial increments. Coronal and sagittal reconstruction was also performed. PRIORS:  None. FINDINGS:     There is no evidence for renal calculi or hydronephrosis. No evidence for ureteral or bladder calculus is seen. No evidence for renal or bladder mass is noted. Otherwise, within the limits of a noncontrast exam, the liver, spleen, pancreas, and adrenal glands are unremarkable. Gallbladder has been surgically removed. No evidence for retroperitoneal or pelvic lymphadenopathy is seen. The bowel loops have normal    caliber. No fluid collection, inflammatory change, or free air is seen within the abdomen or pelvis. The appendix is not visualized.        Within the pelvis, the prostate is normal.       Images through the upper abdomen include the lung bases which are expanded and clear. Bony structures show no focal abnormalities. Impression:     1. No evidence for renal calculi or renal obstruction. 2. Negative CT of the abdomen and pelvis. Electronically Signed By: Tavia Bal   on  10/04/2016  20:20     Impressions/Recommendations:     Chronic intermittent umbilical drainage causing pain and discomfort, detailed history as above. Lengthy discussion with patient and wife. Discussed options of umbilical wound exploration down to fascia, possible stitch abscess/foreign body irritation causing intermittent fistula drainage. It is hard to tell today without active drainage. The other option is an umbilectomy. He does not want umbilectomy but wants the wound exploration. I explained to him that I would approach it as an open umbilical hernia repair by making subumbilical curvilinear incision, detaching the umbilical stalk, inspecting wound, try to find the source, then reattach the belly button. I explained that I may not be able to find anything. I cannot see a current opening to order a fistulogram. He is very agreeable to the wound exploration and accepts the risks and benefits. Thank you JAMES Contreras CNP for allowing me to participate in the care of your patients.      Karlee Emery, , MPH, [facility] [place] [address] Surgery, [address] [place] [address] office [phone]  [place] office [phone]

## 2022-11-11 NOTE — TELEPHONE ENCOUNTER
----- Message from Beverley Litten, APRN - CNP sent at 11/11/2022 10:48 AM EST -----  Please notify patient of normal lab results. Let him know I didn't think we had enough on the swab. Continue antibiotics and continue with surgery appointment. Ask him if he is doing any better after starting antibiotics?   Thanks Natali Ram

## 2022-11-12 VITALS
RESPIRATION RATE: 17 BRPM | WEIGHT: 171 LBS | HEIGHT: 69 IN | BODY MASS INDEX: 25.33 KG/M2 | DIASTOLIC BLOOD PRESSURE: 93 MMHG | HEART RATE: 64 BPM | SYSTOLIC BLOOD PRESSURE: 137 MMHG

## 2022-11-30 ENCOUNTER — ANESTHESIA EVENT (OUTPATIENT)
Dept: OPERATING ROOM | Age: 37
End: 2022-11-30
Payer: MEDICARE

## 2022-12-01 ENCOUNTER — ANESTHESIA (OUTPATIENT)
Dept: OPERATING ROOM | Age: 37
End: 2022-12-01
Payer: MEDICARE

## 2022-12-01 ENCOUNTER — HOSPITAL ENCOUNTER (OUTPATIENT)
Age: 37
Setting detail: OUTPATIENT SURGERY
Discharge: HOME | End: 2022-12-01
Attending: SURGERY | Admitting: SURGERY
Payer: MEDICARE

## 2022-12-01 VITALS
RESPIRATION RATE: 18 BRPM | OXYGEN SATURATION: 96 % | SYSTOLIC BLOOD PRESSURE: 114 MMHG | BODY MASS INDEX: 25.21 KG/M2 | TEMPERATURE: 97 F | HEART RATE: 70 BPM | WEIGHT: 170.2 LBS | HEIGHT: 69 IN | DIASTOLIC BLOOD PRESSURE: 78 MMHG

## 2022-12-01 DIAGNOSIS — G89.18 ACUTE POSTOPERATIVE PAIN: Primary | ICD-10-CM

## 2022-12-01 DIAGNOSIS — R10.33 UMBILICAL PAIN: ICD-10-CM

## 2022-12-01 PROCEDURE — 10060 I&D ABSCESS SIMPLE/SINGLE: CPT | Performed by: SURGERY

## 2022-12-01 PROCEDURE — 7100000000 HC PACU RECOVERY - FIRST 15 MIN: Performed by: SURGERY

## 2022-12-01 PROCEDURE — 6370000000 HC RX 637 (ALT 250 FOR IP): Performed by: NURSE ANESTHETIST, CERTIFIED REGISTERED

## 2022-12-01 PROCEDURE — 2580000003 HC RX 258: Performed by: NURSE ANESTHETIST, CERTIFIED REGISTERED

## 2022-12-01 PROCEDURE — 88304 TISSUE EXAM BY PATHOLOGIST: CPT

## 2022-12-01 PROCEDURE — 3700000001 HC ADD 15 MINUTES (ANESTHESIA): Performed by: SURGERY

## 2022-12-01 PROCEDURE — 2500000003 HC RX 250 WO HCPCS: Performed by: SURGERY

## 2022-12-01 PROCEDURE — 7100000010 HC PHASE II RECOVERY - FIRST 15 MIN: Performed by: SURGERY

## 2022-12-01 PROCEDURE — 7100000001 HC PACU RECOVERY - ADDTL 15 MIN: Performed by: SURGERY

## 2022-12-01 PROCEDURE — 3700000000 HC ANESTHESIA ATTENDED CARE: Performed by: SURGERY

## 2022-12-01 PROCEDURE — 6360000002 HC RX W HCPCS: Performed by: SURGERY

## 2022-12-01 PROCEDURE — 6360000002 HC RX W HCPCS: Performed by: NURSE ANESTHETIST, CERTIFIED REGISTERED

## 2022-12-01 PROCEDURE — 7100000011 HC PHASE II RECOVERY - ADDTL 15 MIN: Performed by: SURGERY

## 2022-12-01 PROCEDURE — 2709999900 HC NON-CHARGEABLE SUPPLY: Performed by: SURGERY

## 2022-12-01 PROCEDURE — 3600000012 HC SURGERY LEVEL 2 ADDTL 15MIN: Performed by: SURGERY

## 2022-12-01 PROCEDURE — 2500000003 HC RX 250 WO HCPCS: Performed by: NURSE ANESTHETIST, CERTIFIED REGISTERED

## 2022-12-01 PROCEDURE — 3600000002 HC SURGERY LEVEL 2 BASE: Performed by: SURGERY

## 2022-12-01 RX ORDER — BUPIVACAINE HYDROCHLORIDE AND EPINEPHRINE 5; 5 MG/ML; UG/ML
INJECTION, SOLUTION EPIDURAL; INTRACAUDAL; PERINEURAL PRN
Status: DISCONTINUED | OUTPATIENT
Start: 2022-12-01 | End: 2022-12-01 | Stop reason: ALTCHOICE

## 2022-12-01 RX ORDER — SODIUM CHLORIDE, SODIUM LACTATE, POTASSIUM CHLORIDE, CALCIUM CHLORIDE 600; 310; 30; 20 MG/100ML; MG/100ML; MG/100ML; MG/100ML
INJECTION, SOLUTION INTRAVENOUS CONTINUOUS
Status: DISCONTINUED | OUTPATIENT
Start: 2022-12-01 | End: 2022-12-01 | Stop reason: HOSPADM

## 2022-12-01 RX ORDER — SODIUM CHLORIDE 9 MG/ML
INJECTION, SOLUTION INTRAVENOUS PRN
Status: DISCONTINUED | OUTPATIENT
Start: 2022-12-01 | End: 2022-12-01 | Stop reason: HOSPADM

## 2022-12-01 RX ORDER — NEOSTIGMINE METHYLSULFATE 1 MG/ML
INJECTION, SOLUTION INTRAVENOUS PRN
Status: DISCONTINUED | OUTPATIENT
Start: 2022-12-01 | End: 2022-12-01 | Stop reason: SDUPTHER

## 2022-12-01 RX ORDER — ROCURONIUM BROMIDE 10 MG/ML
INJECTION, SOLUTION INTRAVENOUS PRN
Status: DISCONTINUED | OUTPATIENT
Start: 2022-12-01 | End: 2022-12-01 | Stop reason: SDUPTHER

## 2022-12-01 RX ORDER — FENTANYL CITRATE 50 UG/ML
INJECTION, SOLUTION INTRAMUSCULAR; INTRAVENOUS PRN
Status: DISCONTINUED | OUTPATIENT
Start: 2022-12-01 | End: 2022-12-01 | Stop reason: SDUPTHER

## 2022-12-01 RX ORDER — PROPOFOL 10 MG/ML
INJECTION, EMULSION INTRAVENOUS PRN
Status: DISCONTINUED | OUTPATIENT
Start: 2022-12-01 | End: 2022-12-01 | Stop reason: SDUPTHER

## 2022-12-01 RX ORDER — GLYCOPYRROLATE 0.2 MG/ML
INJECTION INTRAMUSCULAR; INTRAVENOUS PRN
Status: DISCONTINUED | OUTPATIENT
Start: 2022-12-01 | End: 2022-12-01 | Stop reason: SDUPTHER

## 2022-12-01 RX ORDER — SODIUM CHLORIDE 0.9 % (FLUSH) 0.9 %
5-40 SYRINGE (ML) INJECTION EVERY 12 HOURS SCHEDULED
Status: DISCONTINUED | OUTPATIENT
Start: 2022-12-01 | End: 2022-12-01 | Stop reason: HOSPADM

## 2022-12-01 RX ORDER — OXYCODONE HYDROCHLORIDE 5 MG/1
10 TABLET ORAL PRN
Status: COMPLETED | OUTPATIENT
Start: 2022-12-01 | End: 2022-12-01

## 2022-12-01 RX ORDER — FENTANYL CITRATE 50 UG/ML
25 INJECTION, SOLUTION INTRAMUSCULAR; INTRAVENOUS EVERY 5 MIN PRN
Status: DISCONTINUED | OUTPATIENT
Start: 2022-12-01 | End: 2022-12-01 | Stop reason: HOSPADM

## 2022-12-01 RX ORDER — ACETAMINOPHEN 325 MG/1
650 TABLET ORAL ONCE
Status: COMPLETED | OUTPATIENT
Start: 2022-12-01 | End: 2022-12-01

## 2022-12-01 RX ORDER — OXYCODONE HYDROCHLORIDE 5 MG/1
5 TABLET ORAL PRN
Status: COMPLETED | OUTPATIENT
Start: 2022-12-01 | End: 2022-12-01

## 2022-12-01 RX ORDER — HYDROCODONE BITARTRATE AND ACETAMINOPHEN 5; 325 MG/1; MG/1
1 TABLET ORAL
Qty: 18 TABLET | Refills: 0 | Status: SHIPPED | OUTPATIENT
Start: 2022-12-01 | End: 2022-12-08

## 2022-12-01 RX ORDER — SODIUM CHLORIDE, SODIUM LACTATE, POTASSIUM CHLORIDE, CALCIUM CHLORIDE 600; 310; 30; 20 MG/100ML; MG/100ML; MG/100ML; MG/100ML
INJECTION, SOLUTION INTRAVENOUS CONTINUOUS PRN
Status: DISCONTINUED | OUTPATIENT
Start: 2022-12-01 | End: 2022-12-01 | Stop reason: SDUPTHER

## 2022-12-01 RX ORDER — ONDANSETRON 2 MG/ML
INJECTION INTRAMUSCULAR; INTRAVENOUS PRN
Status: DISCONTINUED | OUTPATIENT
Start: 2022-12-01 | End: 2022-12-01 | Stop reason: SDUPTHER

## 2022-12-01 RX ORDER — SODIUM CHLORIDE 0.9 % (FLUSH) 0.9 %
5-40 SYRINGE (ML) INJECTION PRN
Status: DISCONTINUED | OUTPATIENT
Start: 2022-12-01 | End: 2022-12-01 | Stop reason: HOSPADM

## 2022-12-01 RX ORDER — DEXAMETHASONE SODIUM PHOSPHATE 4 MG/ML
INJECTION, SOLUTION INTRA-ARTICULAR; INTRALESIONAL; INTRAMUSCULAR; INTRAVENOUS; SOFT TISSUE PRN
Status: DISCONTINUED | OUTPATIENT
Start: 2022-12-01 | End: 2022-12-01 | Stop reason: SDUPTHER

## 2022-12-01 RX ORDER — LIDOCAINE HYDROCHLORIDE 20 MG/ML
INJECTION, SOLUTION EPIDURAL; INFILTRATION; INTRACAUDAL; PERINEURAL PRN
Status: DISCONTINUED | OUTPATIENT
Start: 2022-12-01 | End: 2022-12-01 | Stop reason: SDUPTHER

## 2022-12-01 RX ORDER — KETOROLAC TROMETHAMINE 30 MG/ML
INJECTION, SOLUTION INTRAMUSCULAR; INTRAVENOUS PRN
Status: DISCONTINUED | OUTPATIENT
Start: 2022-12-01 | End: 2022-12-01 | Stop reason: SDUPTHER

## 2022-12-01 RX ORDER — FENTANYL CITRATE 50 UG/ML
50 INJECTION, SOLUTION INTRAMUSCULAR; INTRAVENOUS EVERY 5 MIN PRN
Status: DISCONTINUED | OUTPATIENT
Start: 2022-12-01 | End: 2022-12-01 | Stop reason: HOSPADM

## 2022-12-01 RX ORDER — DIMENHYDRINATE 50 MG/1
50 TABLET ORAL ONCE
Status: COMPLETED | OUTPATIENT
Start: 2022-12-01 | End: 2022-12-01

## 2022-12-01 RX ADMIN — DIMENHYDRINATE 50 MG: 50 TABLET ORAL at 08:33

## 2022-12-01 RX ADMIN — CEFAZOLIN 2000 MG: 10 INJECTION, POWDER, FOR SOLUTION INTRAVENOUS at 10:29

## 2022-12-01 RX ADMIN — KETOROLAC TROMETHAMINE 30 MG: 30 INJECTION, SOLUTION INTRAMUSCULAR; INTRAVENOUS at 11:31

## 2022-12-01 RX ADMIN — FENTANYL CITRATE 25 MCG: 50 INJECTION INTRAMUSCULAR; INTRAVENOUS at 11:43

## 2022-12-01 RX ADMIN — ROCURONIUM BROMIDE 10 MG: 10 INJECTION, SOLUTION INTRAVENOUS at 11:03

## 2022-12-01 RX ADMIN — FENTANYL CITRATE 25 MCG: 50 INJECTION INTRAMUSCULAR; INTRAVENOUS at 10:57

## 2022-12-01 RX ADMIN — LIDOCAINE HYDROCHLORIDE 5 ML: 20 INJECTION, SOLUTION EPIDURAL; INFILTRATION; INTRACAUDAL; PERINEURAL at 10:34

## 2022-12-01 RX ADMIN — SODIUM CHLORIDE, POTASSIUM CHLORIDE, SODIUM LACTATE AND CALCIUM CHLORIDE: 600; 310; 30; 20 INJECTION, SOLUTION INTRAVENOUS at 10:31

## 2022-12-01 RX ADMIN — NEOSTIGMINE METHYLSULFATE 1.5 MG: 1 INJECTION INTRAVENOUS at 11:31

## 2022-12-01 RX ADMIN — DEXAMETHASONE SODIUM PHOSPHATE 8 MG: 4 INJECTION, SOLUTION INTRAMUSCULAR; INTRAVENOUS at 10:49

## 2022-12-01 RX ADMIN — FENTANYL CITRATE 25 MCG: 50 INJECTION INTRAMUSCULAR; INTRAVENOUS at 11:03

## 2022-12-01 RX ADMIN — SODIUM CHLORIDE, POTASSIUM CHLORIDE, SODIUM LACTATE AND CALCIUM CHLORIDE: 600; 310; 30; 20 INJECTION, SOLUTION INTRAVENOUS at 08:45

## 2022-12-01 RX ADMIN — ACETAMINOPHEN 650 MG: 325 TABLET ORAL at 08:33

## 2022-12-01 RX ADMIN — OXYCODONE 10 MG: 5 TABLET ORAL at 12:33

## 2022-12-01 RX ADMIN — PROPOFOL 200 MG: 10 INJECTION, EMULSION INTRAVENOUS at 10:34

## 2022-12-01 RX ADMIN — FENTANYL CITRATE 25 MCG: 50 INJECTION INTRAMUSCULAR; INTRAVENOUS at 10:50

## 2022-12-01 RX ADMIN — FENTANYL CITRATE 25 MCG: 50 INJECTION INTRAMUSCULAR; INTRAVENOUS at 10:40

## 2022-12-01 RX ADMIN — ONDANSETRON 4 MG: 2 INJECTION INTRAMUSCULAR; INTRAVENOUS at 11:31

## 2022-12-01 RX ADMIN — FENTANYL CITRATE 25 MCG: 50 INJECTION INTRAMUSCULAR; INTRAVENOUS at 10:44

## 2022-12-01 RX ADMIN — GLYCOPYRROLATE 0.3 MG: 0.2 INJECTION INTRAMUSCULAR; INTRAVENOUS at 11:31

## 2022-12-01 RX ADMIN — FENTANYL CITRATE 25 MCG: 50 INJECTION INTRAMUSCULAR; INTRAVENOUS at 11:40

## 2022-12-01 RX ADMIN — FENTANYL CITRATE 25 MCG: 50 INJECTION INTRAMUSCULAR; INTRAVENOUS at 11:32

## 2022-12-01 ASSESSMENT — LIFESTYLE VARIABLES: SMOKING_STATUS: 1

## 2022-12-01 ASSESSMENT — PAIN DESCRIPTION - DESCRIPTORS
DESCRIPTORS: ACHING
DESCRIPTORS: SORE;ACHING

## 2022-12-01 ASSESSMENT — PAIN SCALES - GENERAL
PAINLEVEL_OUTOF10: 3
PAINLEVEL_OUTOF10: 7

## 2022-12-01 ASSESSMENT — PAIN DESCRIPTION - LOCATION
LOCATION: ABDOMEN
LOCATION: ABDOMEN

## 2022-12-01 ASSESSMENT — PAIN DESCRIPTION - ORIENTATION: ORIENTATION: MID

## 2022-12-01 NOTE — ANESTHESIA POSTPROCEDURE EVALUATION
Department of Anesthesiology  Postprocedure Note    Patient: Carlos Rodriguez  MRN: [id number]  YOB: 1985  Date of evaluation: 12/1/2022      Procedure Summary     Date: 12/01/22 Room / Location: [address]    Anesthesia Start: 8301 Anesthesia Stop: 1152    Procedure: ABDOMEN INCISION AND DRAINAGE- UMBILICAL WOUND EXPLORATION Diagnosis:       Umbilical pain      (UMBILICAL PAIN  AND DRAINAGE, FISTULA)    Surgeons: Flavia Hernandez DO Responsible Provider: JAMES Mg CRNA    Anesthesia Type: general, TIVA ASA Status: 2          Anesthesia Type: No value filed.     Deborah Phase I: Deborah Score: 9    Deborah Phase II: Deborah Score: 10      Anesthesia Post Evaluation    Patient location during evaluation: PACU  Patient participation: complete - patient participated  Level of consciousness: awake and alert  Airway patency: patent  Nausea & Vomiting: no nausea and no vomiting  Complications: no  Cardiovascular status: blood pressure returned to baseline and hemodynamically stable  Respiratory status: acceptable and room air  Hydration status: euvolemic

## 2022-12-01 NOTE — FLOWSHEET NOTE
Wife hands pager in and states she doesn't need it she is leaving, verified she will return to department for discharge. Phone number verified.

## 2022-12-01 NOTE — PROGRESS NOTES
Patient states ready to be discharged at this wanda, after talking with doctor. Discharge instructions given to pt and spouse. Both verbalize understanding,deny any questions and/or concerns. Pt denies need for wheelchair and transfers self off unit with steady gait noted and all belongings. Discharge Criteria    Inpatients must meet Criteria 1 through 7. All other patients are either YES or N/A. If a NO is chosen then Anesthesia or Surgeon must be notified. 1.  Minimum 30 minutes after last dose of sedative medication, minimum 120 minutes after last dose of reversal agent. Yes      2. Systolic BP stable within 20 mmHg for 30 minutes & systolic BP between 90 & 584 or within 10 mmHg of baseline. Yes      3. Pulse between 60 and 100 or within 10 bpm of baseline. Yes      4. Spontaneous respiratory rate >/= 10 per minute. Yes      5. SaO2 >/= 95 or  >/= baseline. Yes      6. Able to cough and swallow or return to baseline function. Yes      7. Alert and oriented or return to baseline mental status. Yes      8. Demonstrates controlled, coordinated movements, ambulates with steady gait, or return to baseline activity function. Yes      9. Minimal or no pain or nausea, or at a level tolerable and acceptable to patient. Yes      10. Takes and retains oral fluids as allowed. Yes      11. Procedural / perioperative site stable. Minimal or no bleeding. N/A          12. If GI endoscopy procedure, minimal or no abdominal distention or passing flatus. N/A      13. Written discharge instructions and emergency telephone number provided. Yes    14. Accompanied by a responsible adult.     Yes

## 2022-12-01 NOTE — BRIEF OP NOTE
Brief Postoperative Note      Patient: Eris Gutierrez  YOB: 1985  MRN: Marce De La Garza, APRN - CNP      Date of Procedure: 12/1/2022    Pre-Op Diagnosis: chronic intermittent umbilical drainage and pain    Post-Op Diagnosis: Same       Procedure(s):  ABDOMEN INCISION AND DRAINAGE- UMBILICAL WOUND EXPLORATION    Surgeon(s):  Jabari Teixeira DO    Assistant:  * No surgical staff found *    Anesthesia: General + local    Estimated Blood Loss (mL): 2 ml    Complications: None    Specimens:   ID Type Source Tests Collected by Time Destination   A :  Tissue Umbilical SURGICAL PATHOLOGY Jabari Teixeira DO 12/1/2022 1111        Electronically signed by Jabari Teixeira DO, FACOS, FACS on 12/2/2022 at 10:46 PM

## 2022-12-01 NOTE — ANESTHESIA PRE PROCEDURE
Department of Anesthesiology  Preprocedure Note       Name:  Dale Lucas   Age:  30 y.o.  :  1985                                          MRN:  [id number]         Date:  2022      Surgeon: Felecia Sanchez):  Ashish Becker DO    Procedure: Procedure(s):  ABDOMEN INCISION AND DRAINAGE- UMBILICAL WOUND EXPLORATION    Medications prior to admission:   Prior to Admission medications    Medication Sig Start Date End Date Taking? Authorizing Provider   Multiple Vitamin (MULTI VITAMIN MENS) TABS Take by mouth daily   Yes Historical Provider, MD   rOPINIRole (REQUIP) 0.25 MG tablet Take 0.25 mg by mouth nightly as needed    Historical Provider, MD       Current medications:    Current Facility-Administered Medications   Medication Dose Route Frequency Provider Last Rate Last Admin    ceFAZolin (ANCEF) 2000 mg in dextrose 5 % 100 mL IVPB  2,000 mg IntraVENous Once Priti Montero DO        lactated ringers infusion   IntraVENous Continuous JAMES Vallejo - CRNA 100 mL/hr at 22 0845 New Bag at 22 0845       Allergies:  No Known Allergies    Problem List:    Patient Active Problem List   Diagnosis Code    Umbilical pain C43.76       Past Medical History:        Diagnosis Date    Gallstones     Kidney stones     RLS (restless legs syndrome)        Past Surgical History:        Procedure Laterality Date    APPENDECTOMY      CHOLECYSTECTOMY      done re        Social History:    Social History     Tobacco Use    Smoking status: Every Day     Packs/day: 0.50     Years: 18.00     Pack years: 9.00     Types: Cigarettes     Start date:     Smokeless tobacco: Never   Substance Use Topics    Alcohol use:  No                                Ready to quit: Not Answered  Counseling given: Not Answered      Vital Signs (Current):   Vitals:    22 1027 22 0820 22 0830   BP:   116/64   Pulse:   71   Resp:   15   Temp:   36.3 °C (97.3 °F)   TempSrc:   Temporal   SpO2: 96%   Weight: 170 lb (77.1 kg) 170 lb 3.2 oz (77.2 kg)    Height: 5' 9\" (1.753 m)                                                BP Readings from Last 3 Encounters:   12/01/22 116/64   11/11/22 (!) 137/93   11/09/22 108/80       NPO Status: Time of last liquid consumption: 1900                        Time of last solid consumption: 1030                        Date of last liquid consumption: 11/30/22                        Date of last solid food consumption: 11/30/22    BMI:   Wt Readings from Last 3 Encounters:   12/01/22 170 lb 3.2 oz (77.2 kg)   11/11/22 171 lb (77.6 kg)   11/09/22 171 lb 3.2 oz (77.7 kg)     Body mass index is 25.13 kg/m². CBC:   Lab Results   Component Value Date/Time    WBC 9.5 02/12/2022 03:46 PM    RBC 4.89 02/12/2022 03:46 PM    HGB 15.4 02/12/2022 03:46 PM    HCT 46.4 02/12/2022 03:46 PM    MCV 94.9 02/12/2022 03:46 PM    RDW 12.6 02/12/2022 03:46 PM     02/12/2022 03:46 PM       CMP:   Lab Results   Component Value Date/Time     02/12/2022 03:46 PM    K 4.0 02/12/2022 03:46 PM     02/12/2022 03:46 PM    CO2 20 02/12/2022 03:46 PM    BUN 9 02/12/2022 03:46 PM    CREATININE 0.76 02/12/2022 03:46 PM    GFRAA >60 02/12/2022 03:46 PM    LABGLOM >60 02/12/2022 03:46 PM    GLUCOSE 81 02/12/2022 03:46 PM    PROT 7.2 02/12/2022 03:46 PM    CALCIUM 9.7 02/12/2022 03:46 PM    BILITOT 0.50 02/12/2022 03:46 PM    ALKPHOS 87 02/12/2022 03:46 PM    AST 17 02/12/2022 03:46 PM    ALT 16 02/12/2022 03:46 PM       POC Tests: No results for input(s): POCGLU, POCNA, POCK, POCCL, POCBUN, POCHEMO, POCHCT in the last 72 hours.     Coags: No results found for: PROTIME, INR, APTT    HCG (If Applicable): No results found for: PREGTESTUR, PREGSERUM, HCG, HCGQUANT     ABGs: No results found for: PHART, PO2ART, YNJ7LLL, UPC2LYN, BEART, [id number]     Type & Screen (If Applicable):  No results found for: LABABO, LABRH    Drug/Infectious Status (If Applicable):  No results found for: HIV, HEPCAB    COVID-19 Screening (If Applicable):   Lab Results   Component Value Date/Time    COVID19 Not Detected 02/12/2022 05:48 PM           Anesthesia Evaluation  Patient summary reviewed and Nursing notes reviewed no history of anesthetic complications:   Airway: Mallampati: II  TM distance: >3 FB   Neck ROM: full  Mouth opening: > = 3 FB   Dental:          Pulmonary:normal exam    (+) current smoker          Patient did not smoke on day of surgery. Cardiovascular:  Exercise tolerance: good (>4 METS),                     Neuro/Psych:   Negative Neuro/Psych ROS              GI/Hepatic/Renal: Neg GI/Hepatic/Renal ROS            Endo/Other: Negative Endo/Other ROS                    Abdominal:             Vascular: Other Findings:           Anesthesia Plan      general and TIVA     ASA 2       Induction: intravenous. MIPS: Postoperative opioids intended and Prophylactic antiemetics administered. Anesthetic plan and risks discussed with patient.                         JAMES Carpio - CRNA   12/1/2022

## 2022-12-01 NOTE — DISCHARGE INSTRUCTIONS
Ok to shower after 24 hours from surgery. Leave the steristrips on. There will be bruising, hardness, swelling, numbness, soreness for a couple of weeks. No heavy exercise or lifting over 30 pounds for 2 weeks. Some scar tissue was removed, no infection found, no foreign body found, everything looked good. The sutures used to close up the incision are absorbable over time. The steristrips need to stay on for 2 weeks. Expect mild drainage. Ok to drive when pain is controlled. Regular diet. Avoid constipation straining take stool softeners or laxative as needed. Tylenol or motrin for milder pain. Ice pack is helpful 1-2 times a day for 10 minutes each. Your follow up appt is 3:15 pm next Wednesday Dec 7      SAME DAY SURGERY DISCHARGE INSTRUCTIONS    1. Do not drive or operate hazardous machinery for 24 hours. 2.  Do not make important personal or business decisions for 24 hours. 3.  Do not drink alcoholic beverages for 24 hours. 4.  Do not smoke tobacco products for 24 hours. 5.  Eat light foods (Jell-O, soups, etc....) and drink plenty of fluids (water, Sprite, etc...) up to 8 glasses per day, as you can tolerate. 6.  If your bandages become soaked with bright red blood, place another dressing pad over your bandages. (DO NOT remove original bandage.)  Call your surgeon for further instructions. A small amount of bright red blood is to be expected. 7.  Limit your activities for 24 hours. Do not engage in heavy work until your surgeon gives you permission. 8.  Report the following signs or any questions regarding your physical condition to your surgeon immediately:    Excessive swelling of, or around the wound area. Redness. Temperature of 100 degrees (F) or above. Excessive pain. 9.  Call your surgeon for any questions regarding your surgery. 10.  Wash hands before and after incision care.   It is important to practice good personal hygiene during the post op period.

## 2022-12-01 NOTE — H&P
GENERAL SURGERY CONSULTATION        Patient's Name/ Date of Birth/ Gender: Frank Weber / 1985 (30 y.o.) / male      PCP: JAMES Contreras CNP  Referring:      History of present Illness:  Patient is a pleasant 30 y.o. male  kindly referred by JAMES Contreras CNP. He is here withhis wife. He has chronic intermittent umbilical drainage that has bothered him for years. He had laparoscopic surgery in [place] years ago and then developed umbilical drainage later from the port site at umbilicus. He says one time he pulled out a piece of tubular plastic which sounds like a bit of suture. No op notes. No fevers. He has had several imaging studies and seen several specialists or possible hernia, for cyst, abscess, etc. When he takes antibiotics it gets better. Then it will swell up and drain. It causes pain and discomfort and frustration. In February of this year the pain and drainage was concerning enough to go to the ER in [place] and ER doctor recommended admission with IV antibiotics but he left AMA, he says he was given a sedative that made him loopy and anxious which is why he left. Progress Notes  - documented in this encounter  Katie DO Krista - 12/02/2020 1:15 PM EST  Formatting of this note is different from the original.  Images from the original note were not included. [facility] [id number] [facility]  [address] [place] [id number]  Phone: [phone]  Fax: [phone]     CONSULT NOTE     Ag Gómez 37 y.o. 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 He felt something pop and then he went to the emergency room where the ER doctor supposedly push something back in.  He can stand up straight and he feels like there is constant pressure on the abdominal wall. He had a CT scan of the abdomen performed as well as an ultrasound with findings below. He was transferred from [facility] and admitted to the hospital for observation seen by surgery and was nonsurgical. He was believed to have a repeat rectus abdominus muscle with a small hematoma by ultrasound. He admits to nausea but no vomiting he rates the pain as a 6/10 at all times but does not appear to be in any acute distress. He has been it is Rodriguez's Patel pain into his penis which is similar to his presentation on November 17th. He was seen by Urology due to history of kidney stones and gross hematuria and was recommended a follow-up for an outpatient cystoscopy which he has not done. He smokes tobacco daily and he was told that tobacco was the 1. Cause of bladder cancer. He can not sleep at night. He has not taken any time off from strenuous activity in continues to perform physical exertion all activities. Reason for Exam    umbilical hernia; Abdominal pain, acute, nonlocalized   PACS Images Tesha Ybarra)     Show images for CT abdomen and pelvis with contrast   Interpretation Summary    STUDY: ABDOMEN AND PELVIS CT WITH CONTRAST      CLINICAL HISTORY: Umbilical hernia     COMPARISON: 11/22/2019     TECHNIQUE: Routine CT abdomen and pelvis was performed following the uneventful administration of 100 cc Omnipaque 300. Axial, coronal and sagittal reformatted images as well as delayed excretory phase images were obtained and reviewed. FINDINGS:     Visualized lung bases are unremarkable. Status post cholecystectomy. The liver, spleen, adrenal glands, pancreas, and kidneys are unremarkable. No enlarged abdominal or pelvic lymph nodes. The bladder is unremarkable. Moderate amount of stool in the colon. Prior appendectomy. The small and   large bowel are of normal caliber with no evidence of bowel wall thickening.  The bladder is unremarkable. No free fluid in the abdomen or pelvis. No free intraperitoneal air. Visualized body wall structures are unremarkable. No acute osseous abnormalities or aggressive osseous lesions. IMPRESSION:     No acute abnormalities in the abdomen/pelvis. Specifically, no umbilical hernia or acute periumbilical abnormality identified. All CT scans at this facility use dose modulation, iterative reconstruction, and/or weight based dosing when appropriate to reduce radiation dose to as low as reasonably achievable. Workstation:[id number]     Finalized by Denise Arellano MD on 11/15/2020 9:01 PM     Reason for Exam    Severe periumbilical pain, notable fluid on bedside US without fat or other intraperitoneal contents. Suspect diastasis recti.    PACS Images Maria Alejandra Cullen)     Show images for Ultrasound abdomen limited   Interpretation Summary    HISTORY AND/OR [facility] NOTES  Soft Tissue ultrasound  History: patient was lifting in back of his truck and then had severe abdominal pain and lump just above umbilicus develop 13 hours ago        PROCEDURE  Ultrasound of the abdominal wall     COMPARISON  CT from November 15        FINDINGS     Grayscale and color Doppler images and cine mode images submitted over the periumbilical and supraumbilical region     Corresponding to the area of patient's pain, there is a roughly 3 cm complex fluid collection or hematoma in the subcutaneous fat     This appears to be just superficial to and superior to the umbilicus at the midline     There is suggestion of a tear along the midline linea alba region with separation of up to about 1.5 cm     There is no visible protrusion of bowel or fluid from the peritoneum     The small fat-containing right greater than left umbilical hernia are not imaged on this examination       IMPRESSION:     Findings most consistent with a tear of the midline rectus abdominis or linea alba with small separation of about 1.5 cm and some organized complex fluid superficial to that, presumed hematoma. Infection or phlegmon is considered less likely in the absence of suspicious clinical factors     There is no visible protrusion of bowel or peritoneal contents     There is right greater than left fat-containing inguinal hernia which is not further imaged on this examination        -----------------------------------------------------------------------        Workstation:[id number]     Finalized by Dominique Ocasio MD on 11/16/2020 8:41 AM     · MEDICATION      Current Outpatient Medications:    acetaminophen-codeine (TYLENOL #3) 300-30 mg per tablet, , Disp: , Rfl:    rOPINIRole (REQUIP) 0.25 mg tablet, Take 0.25 mg by mouth nightly as needed. , Disp: , Rfl:    acetaminophen (TYLENOL) 500 mg tablet, Take 1,000 mg by mouth every 6 (six) hours as needed for pain., Disp: , Rfl:    ibuprofen (ADVIL,MOTRIN) 600 mg tablet, Take 600 mg by mouth every 6 (six) hours as needed for pain., Disp: , Rfl:    pantoprazole (PROTONIX) 40 mg EC tablet, Take 1 tablet (40 mg total) by mouth every morning before breakfast for 14 days.  (Patient not taking: Reported on 12/2/2020 ), Disp: 14 tablet, Rfl: 0    · ALLERGY  No Known Allergies    · MEDICAL HISTORY   Past Medical History:   Diagnosis Date    Kidney stones   usually get them once a yr     · SURGICAL HISTORY    Past Surgical History:   Procedure Laterality Date    APPENDECTOMY    CHOLECYSTECTOMY     · SOCIAL HISTORY    Social History     Socioeconomic History    Marital status:    Spouse name: Not on file    Number of children: Not on file    Years of education: Not on file    Highest education level: Not on file   Occupational History    Not on file   Social Needs    Financial resource strain: Not on file    Food insecurity   Worry: Not on file   Inability: Not on file    Transportation needs   Medical: Not on file   Non-medical: Not on file   Tobacco Use    Smoking status: Current Every Day Smoker   Packs/day: 0.50 Types: Cigarettes    Smokeless tobacco: Never Used   Substance and Sexual Activity    Alcohol use: Not Currently    Drug use: Never    Sexual activity: Defer   Lifestyle    Physical activity   Days per week: Not on file   Minutes per session: Not on file    Stress: Not on file   Relationships    Social connections   Talks on phone: Not on file   Gets together: Not on file   Attends Congregational service: Not on file   Active member of club or organization: Not on file   Attends meetings of clubs or organizations: Not on file   Relationship status: Not on file    Intimate partner violence   Fear of current or ex partner: Not on file   Emotionally abused: Not on file   Physically abused: Not on file   Forced sexual activity: Not on file   Other Topics Concern    Not on file   Social History Narrative    Not on file     · FAMILY HISTORY  History reviewed. No pertinent family history. · REVIEW OF SYSTEMS:   · Constitutional: Denies fevers, denies recent illnesses. · Eyes: Denies any vision changes. · ENT: Denies any throat pain. · Neck: Denies any neck pain. · Cardiovascular denies chest pain. Denies palpitations. · Respiratory: Denies shortness of breath, denies cough, denies history of asthma or any other pulmonary illnesses. · Gastrointestinal: See chief complaint  · Genitourinary : Pain and penis as above with history of gross hematuria; negative for dysuria urinary frequency or urgency. Denies any history of STDs   · Musculoskeletal: Negative for extremity pains or joint discomfort. · Neurologic: No change in sensation or paresthesias or history of seizure disorder   · skin: No rashes. · Hematologic: No anemia. No purpura. No petechiae and no prolonged or excessive bleeding  · Allergic and immunologic: No pruritus. No swelling. · Endocrine: No unexplained weight loss. No polydipsia. No polyuria. No polyphagia. · PHYSICAL EXAM    Constitutional: He is oriented to person, place, and time.  Vital signs are normal. He appears well-developed and well-nourished. HEENT: Head: Normocephalic and atraumatic. Eyes: Conjunctivae, EOM and lids are normal.   Neck: Trachea normal. Neck supple. No thyroid mass present. Cardiovascular: Normal rate and regular rhythm. Pulmonary/Chest: Effort normal and breath sounds normal.   Abdominal: Soft. Normal appearance. He exhibits no distension and no mass. There is no hepatosplenomegaly or splenomegaly. There is negative Iniguez's sign. No hernia. Tenderness to palpation just above the umbilicus in the region of the hematoma by ultrasound. Musculoskeletal: Normal range of motion. Lymphadenopathy:   He has no cervical adenopathy. He has no axillary adenopathy. Right: Positive right lymph node 1 cm inguinal and no supraclavicular adenopathy present. Left: No inguinal and no supraclavicular adenopathy present. Neurological: He is alert and oriented to person, place, and time. Skin: Skin is warm, dry and intact. Psychiatric: He has a normal mood and affect. His speech is normal and behavior is normal. Cognition and memory are normal.     · IMPRESSION  1. Abdominal wall strain and hematoma post rectus abdominus tear as demonstrated by ultrasound and no hernia by CT scan  2. Tobacco abuse  3. Gross hematuria with history of kidney stones  4. Lymphadenopathy right groin? Etiology    · ASSESSMENT & PLAN  1. Refrain from lifting or straining and sex for at least 2 weeks and heat or ice to the area p.r.n. whichever feels better  2. Return to Urology for cystoscopy as previously recommended rule out bladder cancer due to history of tobacco use  3. Return to office in 2 weeks for recheck     He and his wife understood all the above. There is nothing surgical at this time. I reviewed all [facility] from [facility]. We will make referral to Urology again.       Denisha Kaplan DO         Past Medical History:  has a past medical history of Gallstones, Kidney stones, and RLS (restless legs syndrome). Past Surgical History:   Past Surgical History         Past Surgical History:   Procedure Laterality Date    APPENDECTOMY        CHOLECYSTECTOMY         done beofre 2016            Social History:  reports that he has been smoking cigarettes. He started smoking about 18 years ago. He has a 18.00 pack-year smoking history. He has never used smokeless tobacco. He reports current drug use. Drug: Marijuana Eliot Goldberg). He reports that he does not drink alcohol. Family History: family history is not on file.      Review of Systems:   General: Completed and, except as mentioned above, was negative or noncontributory  Psychological:  Completed and, except as mentioned above, was negative or noncontributory  Ophthalmic:  Completed and, except as mentioned above, was negative or noncontributory  ENT:  Completed and, except as mentioned above, was negative or noncontributory  Allergy and Immunology:  Completed and, except as mentioned above, was negative or noncontributory  Hematological and Lymphatic:  Completed and, except as mentioned above, was negative or noncontributory  Endocrine: Completed and, except as mentioned above, was negative or noncontributory  Breast:  Completed and, except as mentioned above, was negative or noncontributory  Respiratory:  Completed and, except as mentioned above, was negative or noncontributory  Cardiovascular:  Completed and, except as mentioned above, was negative or noncontributory  Gastrointestinal: Completed and, except as mentioned above, was negative or noncontributory  Genito-Urinary:  Completed and, except as mentioned above, was negative or noncontributory  Musculoskeletal:  Completed and, except as mentioned above, was negative or noncontributory  Neurological:  Completed and, except as mentioned above, was negative or noncontributory  Dermatological:  Completed and, except as mentioned above, was negative or noncontributory     Allergies: Patient has no known allergies. Current Meds:  Current Medication   Current Outpatient Medications:     rOPINIRole (REQUIP) 0.25 MG tablet, Take 0.25 mg by mouth nightly as needed, Disp: , Rfl:     sulfamethoxazole-trimethoprim (BACTRIM DS;SEPTRA DS) 800-160 MG per tablet, Take 1 tablet by mouth 2 times daily for 10 days, Disp: 20 tablet, Rfl: 0        Physical Exam:  Vital signs and Nurse's note reviewed. BP (!) 137/93   Pulse 64   Resp 17   Ht 5' 9\" (1.753 m)   Wt 171 lb (77.6 kg)   BMI 25.25 kg/m²    height is 5' 9\" (1.753 m) and weight is 171 lb (77.6 kg). His blood pressure is 137/93 (abnormal) and his pulse is 64. His respiration is 17. Gen:  A&Ox3, NAD. Pleasant and cooperative. HEENT: PERRLA, EOMI, no scleral icterus  Neck:  no goiter  CVS: Regular rate and rhythm  Resp: Good bilateral air entry, no active wheezing, no labored breathing  Abd: soft, non-tender, non-distended, bowel sounds present, currently the umbilicus looks normal he says it is because he was on recent antibiotics and this is usually the pattern. He has no pictures of it.    Ext: Moves all extremities, no gross focal motor deficits  Skin: No erythema or ulcerations      Labs:         Lab Results   Component Value Date/Time     WBC 9.5 02/12/2022 03:46 PM     HGB 15.4 02/12/2022 03:46 PM     HCT 46.4 02/12/2022 03:46 PM     MCV 94.9 02/12/2022 03:46 PM      02/12/2022 03:46 PM            Lab Results   Component Value Date/Time      02/12/2022 03:46 PM     K 4.0 02/12/2022 03:46 PM      02/12/2022 03:46 PM     CO2 20 02/12/2022 03:46 PM     BUN 9 02/12/2022 03:46 PM     CREATININE 0.76 02/12/2022 03:46 PM     GLUCOSE 81 02/12/2022 03:46 PM     CALCIUM 9.7 02/12/2022 03:46 PM            Lab Results   Component Value Date/Time     ALKPHOS 87 02/12/2022 03:46 PM     ALT 16 02/12/2022 03:46 PM     AST 17 02/12/2022 03:46 PM     PROT 7.2 02/12/2022 03:46 PM     BILITOT 0.50 02/12/2022 03:46 PM     BILIDIR <0.08 02/11/2022 10:35 PM     LABALBU 4.5 02/12/2022 03:46 PM      No results found for: AMYLASE        Lab Results   Component Value Date/Time     LIPASE 43 02/11/2022 10:35 PM      No results found for: INR     Radiologic Studies:     Narrative   EXAMINATION:   CT OF THE ABDOMEN AND PELVIS WITH CONTRAST 2/12/2022 4:31 pm       TECHNIQUE:   CT of the abdomen and pelvis was performed with the administration of   intravenous contrast. Multiplanar reformatted images are provided for review. Dose modulation, iterative reconstruction, and/or weight based adjustment of   the mA/kV was utilized to reduce the radiation dose to as low as reasonably   achievable. COMPARISON:   02/11/2022       HISTORY:   ORDERING SYSTEM PROVIDED HISTORY: Worsening abdominal pain, now with fever -   concern for developing nec fasc   TECHNOLOGIST PROVIDED HISTORY:   Worsening abdominal pain, now with fever - concern for developing nec fasc       Decision Support Exception - unselect if not a suspected or confirmed   emergency medical condition->Emergency Medical Condition (MA)       FINDINGS:   Lower Chest: Visualized portions of the lower thorax are unremarkable. Organs: Liver, spleen, pancreas, adrenal glands, and kidneys appear   unremarkable. Gallbladder surgically absent. GI/Bowel: No bowel obstruction. Appendix has been removed. Pelvis: Urinary bladder decompressed. Unremarkable prostate. Peritoneum/Retroperitoneum: No free air, fluid, or lymphadenopathy. Bones/Soft Tissues: No acute osseous abnormality. Stable minimal skin   thickening in the region of the umbilicus. No soft tissue gas. Impression   Stable minimal skin thickening in the region the umbilicus. No soft tissue   gas.          EXAMINATION:   CT OF THE ABDOMEN AND PELVIS WITH CONTRAST 2/11/2022 5:57 pm       TECHNIQUE:   CT of the abdomen and pelvis was performed with the administration of   intravenous contrast. Multiplanar reformatted images are provided for review. Dose modulation, iterative reconstruction, and/or weight based adjustment of   the mA/kV was utilized to reduce the radiation dose to as low as reasonably   achievable. COMPARISON:   October 28, 2019       HISTORY:   ORDERING SYSTEM PROVIDED HISTORY: abdominal pain   TECHNOLOGIST PROVIDED HISTORY:       abdominal pain   Decision Support Exception - unselect if not a suspected or confirmed   emergency medical condition->Emergency Medical Condition (MA)       FINDINGS:   Lower Chest: The lung bases are clear. Organs: The liver, spleen, pancreas, adrenal glands, and kidneys demonstrate   no acute abnormality. The gallbladder is surgically absent. GI/Bowel: A small hiatal hernia is present. Small bowel appears normal   without evidence of obstruction. Patient is status post prior appendectomy. No focal inflammatory change or wall thickening is present involving the   large bowel. Pelvis: Urinary bladder and prostate gland appear normal.       Peritoneum/Retroperitoneum: No free fluid or free air is present within the   abdomen or pelvis. No aneurysm formation is present. Scattered, benign   appearing mesenteric lymph nodes are again noted. Bones/Soft Tissues: No acute osseous abnormality is present. Soft tissue   thickening is present along the course of the umbilicus, suggesting   cellulitis. Impression   1. Abnormal skin thickening within the region of the umbilicus, suggesting   cellulitis   2. No acute findings within the abdomen or pelvis   3. Prior cholecystectomy and appendectomy             2019:     EXAMINATION:   CT OF THE ABDOMEN AND PELVIS WITHOUT CONTRAST 10/28/2019 9:18 am       TECHNIQUE:   CT of the abdomen and pelvis was performed without the administration of   intravenous contrast. Multiplanar reformatted images are provided for review.    Dose modulation, iterative reconstruction, and/or weight based adjustment of   the mA/kV was utilized to reduce the radiation dose to as low as reasonably   achievable. COMPARISON:   Abdomen and pelvis CT dated 10/04/2016       HISTORY:   ORDERING SYSTEM PROVIDED HISTORY: right flank pain, hematuria   TECHNOLOGIST PROVIDED HISTORY:       right flank pain, hematuria       FINDINGS:   Lower Chest: Normal aeration of the lung bases. Heart is normal in size. No   pericardial effusion. Organs: Liver, pancreas, spleen, bilateral adrenal glands are unremarkable. Bilateral kidneys and ureters are unremarkable. GI/Bowel: Stomach, small and large bowel loops are grossly unremarkable. Pelvis: Urinary bladder is unremarkable. No lymphadenopathy. No soft   tissue masses or abnormal fluid collections. Peritoneum/Retroperitoneum: No free intraperitoneal fluid or air. No   abdominal aortic aneurysm. No retroperitoneal lymphadenopathy. Bones/Soft Tissues: No lytic or blastic lesions in all visualized osseous   structures. Impression   No nephrolithiasis or hydronephrosis. No acute findings. FINAL REPORT       EXAM:  CT ABDOMEN PELVIS WO IV CONTRAST       HISTORY:  right flank pain, hematuria history cholecystectomy       TECHNIQUE:  Unenhanced stone protocol CT of the abdomen and pelvis at 3.0 millimeter axial increments. Coronal and sagittal reconstruction was also performed. PRIORS:  None. FINDINGS:     There is no evidence for renal calculi or hydronephrosis. No evidence for ureteral or bladder calculus is seen. No evidence for renal or bladder mass is noted. Otherwise, within the limits of a noncontrast exam, the liver, spleen, pancreas, and adrenal glands are unremarkable. Gallbladder has been surgically removed. No evidence for retroperitoneal or pelvic lymphadenopathy is seen. The bowel loops have normal    caliber.  No fluid collection, inflammatory change, or free air is seen within the abdomen or pelvis. The appendix is not visualized. Within the pelvis, the prostate is normal.       Images through the upper abdomen include the lung bases which are expanded and clear. Bony structures show no focal abnormalities. Impression:     1. No evidence for renal calculi or renal obstruction. 2. Negative CT of the abdomen and pelvis. Electronically Signed By: Wesly Ocasio   on  10/04/2016  20:20      Impressions/Recommendations:      Chronic intermittent umbilical drainage causing pain and discomfort, detailed history as above. Lengthy discussion with patient and wife. Discussed options of umbilical wound exploration down to fascia, possible stitch abscess/foreign body irritation causing intermittent fistula drainage. It is hard to tell today without active drainage. The other option is an umbilectomy. He does not want umbilectomy but wants the wound exploration. I explained to him that I would approach it as an open umbilical hernia repair by making subumbilical curvilinear incision, detaching the umbilical stalk, inspecting wound, try to find the source, then reattach the belly button. I explained that I may not be able to find anything. I cannot see a current opening to order a fistulogram. He is very agreeable to the wound exploration and accepts the risks and benefits. H&P  General Surgery        Pt Name: Jasmyne San  MRN: [id number]  YOB: 1985  Date of evaluation: 12/1/2022      [x] I have examined the patient and reviewed the H&P/Consult completed, and there are no changes to the patient or plans. [] I have examined the patient and reviewed the H&P/Consult and have noted the following changes: The patient was counseled at length about the risks of burak Covid-19 during their perioperative period and any recovery window from their procedure.   The patient was made aware that burak Covid-19  may worsen their prognosis for recovering from their procedure  and lend to a higher morbidity and/or mortality risk. All material risks, benefits, and reasonable alternatives including postponing the procedure were discussed. The patient does wish to proceed with the procedure at this time.         Electronically signed by Ricky Sinha DO  on 12/1/2022 at 10:16 AM

## 2022-12-01 NOTE — OP NOTE
Operative Note      Patient: Gena Cotton  YOB: 1985  MRN: Earnesteen Rubinstein, APRN - CNP      Date of Procedure: 12/1/2022    Pre-Op Diagnosis: chronic intermittent umbilical drainage and pain. Sub-clinical umbilical fistula    Post-Op Diagnosis: Same       Procedure(s):  Local wound exploration of umbilicus with sharp excision of scar tissue down to fascia    Surgeon(s):  Leandra Stark DO    Assistant:  * No surgical staff found *    Anesthesia: General + local    Estimated Blood Loss (mL): 2 ml    Complications: None    Specimens:   ID Type Source Tests Collected by Time Destination   A :  Tissue Umbilical SURGICAL PATHOLOGY Leandra Stark DO 12/1/2022 1111      Details:    Please see H&P. Pre-op IV antibiotics given. Site inspected/marked in pre-op. Patient brought to OR suite, placed under anesthesia, positioned appropriately, surgical pause performed, site prepped and draped in sterile fashion. Local anesthetic infiltrated at site. Curvilinear subumbilical incision created sharply and dissection carried down to fascia. Umbilical stalk encircled with tonsil clamp and stalk detached with 15 blade. Fascia of abdomen inspected, no foreign body, no abscess, no hernia. The patient had mentioned intermitted drainage directly through the base of umbilicus. There is scar tissue noted. The apex of the detached umbilicus is excised of the scar tissue and submitted. The defect is closed with running 3-0 vicryl sutures. The umbilicus is re-attached to the fascia with 3-0 vicryl U stitches. Wound copiously irrigated. Wound closed with absorbable layer sutures, subcuticular closure. All counts correct. Tolerated well. I spoke with him in recovery room. He came out of anesthesia quite physically and this caused some oozing from the site that stopped with direct pressure and ice pack. Findings discussed. Await path report.  He had mentioned a piece of plastic coming out of umbilicus in the past, this may have been part of the suture used. He had surgey in [place], no op note available, had a lap appy and latrice done, no mesh.      Electronically signed by Percival Kocher, DO, FACOS, FACS on 12/2/2022 at 10:46 PM

## 2022-12-05 LAB — DERMATOLOGY PATHOLOGY REPORT: NORMAL

## 2022-12-29 ENCOUNTER — TELEPHONE (OUTPATIENT)
Dept: SURGERY | Age: 37
End: 2022-12-29

## 2022-12-29 ENCOUNTER — TELEPHONE (OUTPATIENT)
Dept: PRIMARY CARE CLINIC | Age: 37
End: 2022-12-29

## 2022-12-29 NOTE — TELEPHONE ENCOUNTER
Patient called and was upset and crying. Patient states that since he had surgery on 12/1/22 he has been unable to work and is in pain. Furthermore he states \"she promised me she wouldn't cut my belly button and she did. She butchered me. \"     I told him that we would be happy to see him in follow up to examine him and see if there were possibly some surgical complications. He refused stating \"she removed my belly button and I never want to see her again\". I told patient we would not be able to help him if he refuses to come in. I also told him that he is well within his rights to decline treatment from Dr Ruddy Olvera. Patient would like to see Dr Jaylan Harley, so he is scheduled with him on 1/20/23. Of note, patient no showed for his post operative appointment, so this office had no idea patient was having any problems. This communication on 12/29/22 was the first we've had with patient since 12/1/22. We did make attempts to contact him previously about his follow up appointment that he subsequently missed.

## 2022-12-29 NOTE — TELEPHONE ENCOUNTER
Patient called in very angry and tearful. He stated that \" he regrets ever coming to our fxxxxxx office and that he totally regrets it\" I proceeded to tell him to explain to me why. He stated \" kathy sent me to general surgery and she butchered my god dxxx stomach\" I then told the patient he didn't need to curse at me as I would let kathy know, and try to resolve it. I did tell him he needed to contact the surgeon if he is having issues. He told me he will never step foot in that office again. And then hung up on me.

## 2023-01-30 ENCOUNTER — TELEPHONE (OUTPATIENT)
Dept: SURGERY | Age: 38
End: 2023-01-30

## 2023-01-30 NOTE — TELEPHONE ENCOUNTER
Patient left threatening voicemail stating that Dr Tyrone Mariscal lied to him and she removed his belly button. Also states he has fist size lumps in his abdomen. States Dr Batista Ja me\" and that he's ruined financially. He also states he saw another surgeon who told him the surgery was done incorrectly and that he should alexandra. Patient states he is contacting an  to alexandra. Patient also had consult with Dr Jabari Wall scheduled for 1/20 which the patient cancelled.

## (undated) DEVICE — SUTURE MCRYL SZ 4-0 L27IN ABSRB UD L19MM PS-2 1/2 CIR PRIM Y426H

## (undated) DEVICE — YANKAUER,BULB TIP,W/O VENT,RIGID,STERILE: Brand: MEDLINE

## (undated) DEVICE — GAMMEX® NON-LATEX PI ORTHO SIZE 7, STERILE POLYISOPRENE POWDER-FREE SURGICAL GLOVE: Brand: GAMMEX

## (undated) DEVICE — SYRINGE EAR 2OZ ULC SLIMMER TIP FLAT BTM SUCT PWR DISP FOR

## (undated) DEVICE — SOLUTION IV IRRIG POUR BRL 0.9% SODIUM CHL 2F7124

## (undated) DEVICE — SUTURE VCRL SZ 2-0 L27IN ABSRB UD L26MM SH 1/2 CIR J417H

## (undated) DEVICE — ELECTRODE ES AD CRD L15FT DISP FOR PT BELOW 30LB REM

## (undated) DEVICE — MASTISOL ADHESIVE LIQ 2/3ML

## (undated) DEVICE — SHEET, T, LAPAROTOMY, STERILE: Brand: MEDLINE

## (undated) DEVICE — NEEDLE HYPODERM SAFETY  22GX1.5IN

## (undated) DEVICE — HYPODERMIC SAFETY NEEDLE: Brand: MAGELLAN

## (undated) DEVICE — PENCIL ES L3M BTTN SWCH HOLSTER W/ BLDE ELECTRD EDGE

## (undated) DEVICE — BASIC PACK: Brand: MEDLINE INDUSTRIES, INC.

## (undated) DEVICE — SYRINGE MED 10ML LUERLOCK TIP W/O SFTY DISP

## (undated) DEVICE — STRIP,CLOSURE,WOUND,MEDI-STRIP,1/4X3: Brand: MEDLINE

## (undated) DEVICE — SPONGE LAP W18XL18IN WHT COT 4 PLY FLD STRUNG RADPQ DISP ST

## (undated) DEVICE — SUTURE VCRL + SZ 3-0 L27IN ABSRB UD L26MM SH 1/2 CIR VCP416H

## (undated) DEVICE — TUBING, SUCTION, 9/32" X 12', STRAIGHT: Brand: MEDLINE INDUSTRIES, INC.

## (undated) DEVICE — INTENDED FOR TISSUE SEPARATION, AND OTHER PROCEDURES THAT REQUIRE A SHARP SURGICAL BLADE TO PUNCTURE OR CUT.: Brand: BARD-PARKER ® CARBON RIB-BACK BLADES